# Patient Record
Sex: FEMALE | Race: WHITE | Employment: FULL TIME | URBAN - METROPOLITAN AREA
[De-identification: names, ages, dates, MRNs, and addresses within clinical notes are randomized per-mention and may not be internally consistent; named-entity substitution may affect disease eponyms.]

---

## 2018-07-16 ENCOUNTER — HOSPITAL ENCOUNTER (EMERGENCY)
Age: 43
Discharge: PSYCHIATRIC OR SUBSTANCE USE FACILITY | End: 2018-07-17
Attending: EMERGENCY MEDICINE
Payer: COMMERCIAL

## 2018-07-16 VITALS
OXYGEN SATURATION: 100 % | TEMPERATURE: 97.9 F | HEIGHT: 65 IN | SYSTOLIC BLOOD PRESSURE: 113 MMHG | BODY MASS INDEX: 26.33 KG/M2 | HEART RATE: 63 BPM | RESPIRATION RATE: 16 BRPM | DIASTOLIC BLOOD PRESSURE: 76 MMHG | WEIGHT: 158 LBS

## 2018-07-16 DIAGNOSIS — F32.A DEPRESSION, UNSPECIFIED DEPRESSION TYPE: ICD-10-CM

## 2018-07-16 DIAGNOSIS — R45.851 SUICIDAL IDEATION: Primary | ICD-10-CM

## 2018-07-16 DIAGNOSIS — N39.0 URINARY TRACT INFECTION WITHOUT HEMATURIA, SITE UNSPECIFIED: ICD-10-CM

## 2018-07-16 LAB
A/G RATIO: 1.4 (ref 1.1–2.2)
ALBUMIN SERPL-MCNC: 4.5 G/DL (ref 3.4–5)
ALP BLD-CCNC: 68 U/L (ref 40–129)
ALT SERPL-CCNC: 9 U/L (ref 10–40)
AMPHETAMINE SCREEN, URINE: NORMAL
ANION GAP SERPL CALCULATED.3IONS-SCNC: 12 MMOL/L (ref 3–16)
AST SERPL-CCNC: 15 U/L (ref 15–37)
BACTERIA: ABNORMAL /HPF
BARBITURATE SCREEN URINE: NORMAL
BASOPHILS ABSOLUTE: 0.1 K/UL (ref 0–0.2)
BASOPHILS RELATIVE PERCENT: 0.9 %
BENZODIAZEPINE SCREEN, URINE: NORMAL
BILIRUB SERPL-MCNC: 0.7 MG/DL (ref 0–1)
BILIRUBIN URINE: NEGATIVE
BLOOD, URINE: ABNORMAL
BUN BLDV-MCNC: 11 MG/DL (ref 7–20)
CALCIUM SERPL-MCNC: 9.9 MG/DL (ref 8.3–10.6)
CANNABINOID SCREEN URINE: NORMAL
CHLORIDE BLD-SCNC: 102 MMOL/L (ref 99–110)
CLARITY: CLEAR
CO2: 27 MMOL/L (ref 21–32)
COCAINE METABOLITE SCREEN URINE: NORMAL
COLOR: YELLOW
CREAT SERPL-MCNC: 0.9 MG/DL (ref 0.6–1.1)
EOSINOPHILS ABSOLUTE: 0.1 K/UL (ref 0–0.6)
EOSINOPHILS RELATIVE PERCENT: 1.4 %
EPITHELIAL CELLS, UA: ABNORMAL /HPF
ETHANOL: NORMAL MG/DL (ref 0–0.08)
GFR AFRICAN AMERICAN: >60
GFR NON-AFRICAN AMERICAN: >60
GLOBULIN: 3.3 G/DL
GLUCOSE BLD-MCNC: 92 MG/DL (ref 70–99)
GLUCOSE URINE: NEGATIVE MG/DL
HCG QUALITATIVE: NEGATIVE
HCT VFR BLD CALC: 44 % (ref 36–48)
HEMOGLOBIN: 15 G/DL (ref 12–16)
KETONES, URINE: NEGATIVE MG/DL
LEUKOCYTE ESTERASE, URINE: ABNORMAL
LYMPHOCYTES ABSOLUTE: 2.2 K/UL (ref 1–5.1)
LYMPHOCYTES RELATIVE PERCENT: 24.4 %
Lab: NORMAL
MAGNESIUM: 2.2 MG/DL (ref 1.8–2.4)
MCH RBC QN AUTO: 31.5 PG (ref 26–34)
MCHC RBC AUTO-ENTMCNC: 34 G/DL (ref 31–36)
MCV RBC AUTO: 92.6 FL (ref 80–100)
METHADONE SCREEN, URINE: NORMAL
MICROSCOPIC EXAMINATION: YES
MONOCYTES ABSOLUTE: 0.4 K/UL (ref 0–1.3)
MONOCYTES RELATIVE PERCENT: 4.6 %
MUCUS: ABNORMAL /LPF
NEUTROPHILS ABSOLUTE: 6.1 K/UL (ref 1.7–7.7)
NEUTROPHILS RELATIVE PERCENT: 68.7 %
NITRITE, URINE: POSITIVE
OPIATE SCREEN URINE: NORMAL
OXYCODONE URINE: NORMAL
PDW BLD-RTO: 13.7 % (ref 12.4–15.4)
PH UA: 7
PH UA: 7
PHENCYCLIDINE SCREEN URINE: NORMAL
PLATELET # BLD: 260 K/UL (ref 135–450)
PMV BLD AUTO: 8.7 FL (ref 5–10.5)
POTASSIUM SERPL-SCNC: 3.7 MMOL/L (ref 3.5–5.1)
PROPOXYPHENE SCREEN: NORMAL
PROTEIN UA: ABNORMAL MG/DL
RBC # BLD: 4.75 M/UL (ref 4–5.2)
RBC UA: ABNORMAL /HPF (ref 0–2)
SODIUM BLD-SCNC: 141 MMOL/L (ref 136–145)
SPECIFIC GRAVITY UA: <=1.005
TOTAL PROTEIN: 7.8 G/DL (ref 6.4–8.2)
URINE REFLEX TO CULTURE: YES
URINE TYPE: ABNORMAL
UROBILINOGEN, URINE: 0.2 E.U./DL
WBC # BLD: 8.9 K/UL (ref 4–11)
WBC UA: ABNORMAL /HPF (ref 0–5)

## 2018-07-16 PROCEDURE — 83735 ASSAY OF MAGNESIUM: CPT

## 2018-07-16 PROCEDURE — 87186 SC STD MICRODIL/AGAR DIL: CPT

## 2018-07-16 PROCEDURE — 85025 COMPLETE CBC W/AUTO DIFF WBC: CPT

## 2018-07-16 PROCEDURE — 84703 CHORIONIC GONADOTROPIN ASSAY: CPT

## 2018-07-16 PROCEDURE — 99285 EMERGENCY DEPT VISIT HI MDM: CPT

## 2018-07-16 PROCEDURE — 6370000000 HC RX 637 (ALT 250 FOR IP): Performed by: EMERGENCY MEDICINE

## 2018-07-16 PROCEDURE — 81001 URINALYSIS AUTO W/SCOPE: CPT

## 2018-07-16 PROCEDURE — 80053 COMPREHEN METABOLIC PANEL: CPT

## 2018-07-16 PROCEDURE — G0480 DRUG TEST DEF 1-7 CLASSES: HCPCS

## 2018-07-16 PROCEDURE — 87086 URINE CULTURE/COLONY COUNT: CPT

## 2018-07-16 PROCEDURE — 80307 DRUG TEST PRSMV CHEM ANLYZR: CPT

## 2018-07-16 PROCEDURE — 87077 CULTURE AEROBIC IDENTIFY: CPT

## 2018-07-16 RX ORDER — CEFUROXIME AXETIL 250 MG/1
500 TABLET ORAL ONCE
Status: COMPLETED | OUTPATIENT
Start: 2018-07-16 | End: 2018-07-16

## 2018-07-16 RX ADMIN — CEFUROXIME AXETIL 500 MG: 250 TABLET ORAL at 15:20

## 2018-07-16 ASSESSMENT — PATIENT HEALTH QUESTIONNAIRE - PHQ9: SUM OF ALL RESPONSES TO PHQ QUESTIONS 1-9: 21
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
## 2018-07-17 NOTE — ED NOTES
Report called to Jaz Rojas RN at [facility]. She is going to be treated for eating disorder, specifically bulmia , with purging and laxative abuse. Expected patient to be picked up at 0215 this morning.      Angel Luis Pineda RN  07/17/18 0200

## 2018-07-18 LAB
ORGANISM: ABNORMAL
URINE CULTURE, ROUTINE: ABNORMAL
URINE CULTURE, ROUTINE: ABNORMAL

## 2018-07-19 NOTE — PROGRESS NOTES
Culture reviewed, Culture is positive.  Patient is inpatient psych, please ensure they are treating with any antiobiotic that is sensitive

## 2019-12-09 ENCOUNTER — OFFICE VISIT (OUTPATIENT)
Dept: ORTHOPEDIC SURGERY | Age: 44
End: 2019-12-09
Payer: COMMERCIAL

## 2019-12-09 VITALS — HEIGHT: 65 IN | WEIGHT: 158 LBS | BODY MASS INDEX: 26.33 KG/M2

## 2019-12-09 DIAGNOSIS — M25.552 HIP PAIN, LEFT: Primary | ICD-10-CM

## 2019-12-09 PROCEDURE — 99243 OFF/OP CNSLTJ NEW/EST LOW 30: CPT | Performed by: ORTHOPAEDIC SURGERY

## 2019-12-09 RX ORDER — CELECOXIB 200 MG/1
200 CAPSULE ORAL 2 TIMES DAILY
Qty: 60 CAPSULE | Refills: 3 | Status: SHIPPED | OUTPATIENT
Start: 2019-12-09

## 2019-12-23 ENCOUNTER — OFFICE VISIT (OUTPATIENT)
Dept: ORTHOPEDIC SURGERY | Age: 44
End: 2019-12-23
Payer: COMMERCIAL

## 2019-12-23 VITALS — BODY MASS INDEX: 26.33 KG/M2 | HEIGHT: 65 IN | WEIGHT: 158 LBS

## 2019-12-23 DIAGNOSIS — M25.552 HIP PAIN, LEFT: Primary | ICD-10-CM

## 2019-12-23 DIAGNOSIS — M16.12 OSTEOARTHRITIS OF LEFT HIP, UNSPECIFIED OSTEOARTHRITIS TYPE: ICD-10-CM

## 2019-12-23 PROCEDURE — 99213 OFFICE O/P EST LOW 20 MIN: CPT | Performed by: ORTHOPAEDIC SURGERY

## 2020-01-09 ENCOUNTER — NURSE ONLY (OUTPATIENT)
Dept: ORTHOPEDIC SURGERY | Age: 45
End: 2020-01-09
Payer: COMMERCIAL

## 2020-01-09 VITALS — WEIGHT: 158.07 LBS | BODY MASS INDEX: 26.34 KG/M2 | HEIGHT: 65 IN

## 2020-01-09 RX ORDER — METHYLPREDNISOLONE ACETATE 40 MG/ML
80 INJECTION, SUSPENSION INTRA-ARTICULAR; INTRALESIONAL; INTRAMUSCULAR; SOFT TISSUE ONCE
Status: COMPLETED | OUTPATIENT
Start: 2020-01-09 | End: 2020-01-09

## 2020-01-09 RX ADMIN — METHYLPREDNISOLONE ACETATE 80 MG: 40 INJECTION, SUSPENSION INTRA-ARTICULAR; INTRALESIONAL; INTRAMUSCULAR; SOFT TISSUE at 16:44

## 2020-01-09 NOTE — PROGRESS NOTES
Subjective    Patient ID: Kira Kaur is a 38 y.o..  female. Chief Complaint   Patient presents with    Hip Pain     left        Patient presents today for the injection of left hip joint. Pt has been previously diagnosed with osteoarthritis of the left hip as documented in the prior progress notes. This injection is for the patients Left hip. Patient denies and fevers, chills, recent infections, or new injuries to the knee. Pain Assessment:       Patient's medications, allergies, past medical, surgical, social and family histories were reviewed and updated as appropriate. Physical Exam:    The patient does have  pain on motion, there is not an effusion, there is tenderness over the groin region. No erythema noted. Sensation intact to touch. Pulses present distally. Procedure Note:  The patient was given the option of performing today's injection using ultrasound guidance. We discussed the pros and cons of using the ultrasound for guidance. The patient chose to proceed, and today's injection was performed under sterile conditions using and [employer] ultrasound unit with a variable frequency (6.0-15.0  Mhz) linear transducer for localization and needle placement. The image was saved for the medical record. The risks and benefits of an injection were discussed with the patient. The patient had full opportunity to ask questions and all were answered. The patient then provided verbal informed consent. The skin was then prepped with betadine solution and alcohol. Under aseptic conditions, the  left hip joint was injected with 10cc of 1% xylocaine, then a mixture of 3cc of 0.25% marcaine and 2cc (80 mg) of Depomedrol. There were no immediate complications following the injection. The patient was advised of the possibility of injection site reaction and instructed to apply ice to the area.       None  Assessment:  1) cortisone injection of the Left hip   2) Osteoarthritis    Plan:  1) ice, elevation, gentle range of motion as needed for swelling or stiffness of the hip  2) NSAIDs for any pain after injection   3) F/U 3wks for re-evaluation    Salvatore Smith PA-C, scribing for and in the presence of Dr. Vitor Schultz   1/9/2020 6:13 PM    I, Dr. Patricia Burns, personally performed the services described in this documentation as scribed by Nieves Mckeon. DEAN Dillard in my presence, and it is both accurate and complete.     Patricia Burns MD

## 2020-01-09 NOTE — PROGRESS NOTES
SITE;  Left hip    SENSORCAINE    NDC#  31836-849-82  LOT NUMBER: [id number]        LIDOCAINE   NDC# 3722-5626-76  LOT NUMBER: [id number]

## 2020-02-19 ENCOUNTER — OFFICE VISIT (OUTPATIENT)
Dept: ORTHOPEDIC SURGERY | Age: 45
End: 2020-02-19
Payer: COMMERCIAL

## 2020-02-19 VITALS — BODY MASS INDEX: 26.33 KG/M2 | HEIGHT: 65 IN | WEIGHT: 158 LBS

## 2020-02-19 PROCEDURE — 99213 OFFICE O/P EST LOW 20 MIN: CPT | Performed by: ORTHOPAEDIC SURGERY

## 2020-05-27 ENCOUNTER — OFFICE VISIT (OUTPATIENT)
Dept: ORTHOPEDIC SURGERY | Age: 45
End: 2020-05-27
Payer: COMMERCIAL

## 2020-05-27 VITALS — HEIGHT: 65 IN | BODY MASS INDEX: 26.33 KG/M2 | WEIGHT: 158 LBS

## 2020-05-27 PROCEDURE — 20611 DRAIN/INJ JOINT/BURSA W/US: CPT | Performed by: ORTHOPAEDIC SURGERY

## 2020-05-27 PROCEDURE — 99213 OFFICE O/P EST LOW 20 MIN: CPT | Performed by: ORTHOPAEDIC SURGERY

## 2020-05-27 RX ORDER — METHYLPREDNISOLONE ACETATE 40 MG/ML
80 INJECTION, SUSPENSION INTRA-ARTICULAR; INTRALESIONAL; INTRAMUSCULAR; SOFT TISSUE ONCE
Status: COMPLETED | OUTPATIENT
Start: 2020-05-27 | End: 2020-05-27

## 2020-05-27 RX ORDER — LIDOCAINE HYDROCHLORIDE 10 MG/ML
70 INJECTION, SOLUTION INFILTRATION; PERINEURAL ONCE
Status: COMPLETED | OUTPATIENT
Start: 2020-05-27 | End: 2020-05-27

## 2020-05-27 RX ORDER — BUPIVACAINE HYDROCHLORIDE 2.5 MG/ML
60 INJECTION, SOLUTION INFILTRATION; PERINEURAL ONCE
Status: COMPLETED | OUTPATIENT
Start: 2020-05-27 | End: 2020-05-27

## 2020-05-27 RX ADMIN — BUPIVACAINE HYDROCHLORIDE 150 MG: 2.5 INJECTION, SOLUTION INFILTRATION; PERINEURAL at 17:26

## 2020-05-27 RX ADMIN — LIDOCAINE HYDROCHLORIDE 70 ML: 10 INJECTION, SOLUTION INFILTRATION; PERINEURAL at 17:26

## 2020-05-27 RX ADMIN — METHYLPREDNISOLONE ACETATE 80 MG: 40 INJECTION, SUSPENSION INTRA-ARTICULAR; INTRALESIONAL; INTRAMUSCULAR; SOFT TISSUE at 17:25

## 2020-05-27 NOTE — PROGRESS NOTES
MD Rita Enciso, [place]         Orthopaedic Surgery and Sports Medicine    Patient Name: Frida Nina  YOB: 1975  Patient's PCP is ALVERTO PIERRE    SUBJECTIVE  Chief Complaint:  Hip Pain (LEFT)      History of Present Illness:  Frida Nina is a 38 y.o. female here regarding left hip pain. The pain began 2019 and has been progressive. There was not a history of injury. The patient is currently ambulating independently      Location: diffuse  Quality: aching, sharp, tingling and constant  Pain Scale: 7/10  Context: overall course is worsening   Alleviating Factors: rest, avoiding painful activities, cortisone injection  Exacerbating Factors: activity, weight bearing  Associated Symptoms: tingling     Sleep pattern is affected by the chief complaint: Yes  The patient has had PT. The patient has had an injection, her last injection was January 2020 and she reports it lasted approximately 2 months. The patient has taken NSAIDs, currently on Celebrex which she does think helps take the edge off. The patient is working, as a teacher. Pain Assessment:  Pain Assessment  Location of Pain: Other (Comment)  Location Modifiers: Left  Severity of Pain: 7    Review of Systems:  Ace Hawley's review of systems has been performed by intake and observation. All past and current ROS forms have been scanned into the medical record. She has been instructed to contact her primary care provider regarding ROS issues if not already being addressed at this time. There are no recent changes.  The most recent ROS was scanned into media on 05/27/2020    Past Medical History:  (see most recent intake form scanned into media on above date)  Past Medical History:   Diagnosis Date    Hyperlipidemia     Polycystic ovarian disease        Past Surgical History:  (see most recent

## 2020-07-08 ENCOUNTER — OFFICE VISIT (OUTPATIENT)
Dept: ORTHOPEDIC SURGERY | Age: 45
End: 2020-07-08
Payer: COMMERCIAL

## 2020-07-08 VITALS — BODY MASS INDEX: 26.33 KG/M2 | HEIGHT: 65 IN | WEIGHT: 158 LBS

## 2020-07-08 PROCEDURE — 99213 OFFICE O/P EST LOW 20 MIN: CPT | Performed by: ORTHOPAEDIC SURGERY

## 2020-07-08 NOTE — PROGRESS NOTES
The most recent ROS was scanned into media on 05/27/2020    Past Medical History:  (see most recent intake form scanned into media on above date)  Past Medical History:   Diagnosis Date    Hyperlipidemia     Polycystic ovarian disease        Past Surgical History:  (see most recent intake form scanned into media on above date)  Past Surgical History:   Procedure Laterality Date    CYSTOSCOPY      KNEE SURGERY      reconstructive    TONSILLECTOMY         Allergies:  (see most recent intake form scanned into media on above date)  No Known Allergies    Medications:  (see most recent intake form scanned into media on above date)  Current Outpatient Medications   Medication Sig Dispense Refill    celecoxib (CELEBREX) 200 MG capsule Take 1 capsule by mouth 2 times daily 60 capsule 3    metFORMIN (GLUCOPHAGE) 500 MG tablet Take 500 mg by mouth daily (with breakfast).  MELOXICAM PO Take 15 tablets by mouth daily       vitamin D (ERGOCALCIFEROL) 36622 UNITS CAPS capsule Take 50,000 Units by mouth once a week. No current facility-administered medications for this visit. OBJECTIVE  PHYSICAL EXAM  Vital Signs: There were no vitals filed for this visit. Body mass index is 26.29 kg/m². General Appearance: Patient is adequately groomed with no evidence of malnutrition   Orientation: Patient is alert and oriented to person, place and time  Mood and Affect: Neutral/Euthymic(normal)  Gait and Station: antalgic. The patient can bear weight on the extremity. Left Hip Examination  Inspection:  No gross deformities noted           no swelling noted. No erythema or ecchymosis. Palpation: mild tenderness to palpation on the anterior. Range of Motion: limited range of motion with all directions    Stability and Special Testing: Log roll test: negative             Straight leg test: negative    Strength: No gross motor weakness noted. All muscle groups appear to be functioning.  Motor exam of the lower extremities show quadriceps, hamstrings, foot dorsiflexion and plantarflexion grossly intact. Neurologic: Sensation to both feet is grossly intact to light touch. Vascular: The bilateral lower extremities are warm and well-perfused with brisk capillary refill. Lymphatic: The lymphatic examination bilaterally reveals all areas to be without enlargement or induration    Skin: intact with no cellulitis, rashes, ulcerations, lymphedema or cutaneous lesions noted. Additional Examinations:  Right Lower Extremity: Examination of the right lower extremity does not show any tenderness, deformity or injury. Range of motion is within normal limits. There is no gross instability. There are no rashes, ulcerations or lesions. Strength and tone are normal.      DIAGNOSTICS  Xrays obtained in office today: No  Xrays reviewed today: Yes  2 views of the left hip   Fracture: No  Dislocation: No  Femoracetabular joint arthritis: moderate  joint space narrowing especially superior lateral she does have cam lesions bilaterally evidence of femoral acetabular impingement. MRI of the left hip from December 11, 2019 shows osteochondral erosions of the left acetabulum with labral tearing, joint space loss and subchondral pseudocyst formation likely a manifestation of chronic cam type impingement. Moderate left hip effusion. Capsular cyst versus para labral cyst anteriorly projecting from the left femoral acetabular joint. Peritendinitis and tendinosis of the iliopsoas and gluteal tendons. ASSESSMENT (Medical Decision Making)    Glo Esquivel is a 41 y.o. female with the following diagnosis: Left hip femoral acetabular into impingement with some joint space narrowing      ICD-10-CM    1. Osteoarthritis of left hip, unspecified osteoarthritis type M16.12    2.  Hip pain, left M25.552            PLAN (Medical Decision Making)  Office Procedures:  No orders of the defined types were placed in this encounter. Treatment Plan:    I discussed the diagnosis and treatment options with Donzell Blizzard today. Today, the patient has not seen much improvement with her latest injection. She would like to proceed with a hip joint replacement and we would suggest she see Dr. Tammy Gruber. Non-steroidal anti-inflammatories medications (NSAIDs) can be used to assist with pain control and to reduce inflammatory changes. These medications may be over-the-counter or prescribed. We discussed taking the NSAID properly and the precautions. The patient understands that this medication may potentially interfere with other medications. Patient was also instructed to immediately discontinue the medication is there is any possible complication. Donzell Blizzard was instructed to call the office if her symptoms worsen or if new symptoms appear prior to the next scheduled visit. She is specifically instructed to contact the office between now and schedule appointment if she has concerns related to her condition or if she needs assistance in scheduling any above tests. She is welcome to call for an appointment sooner if she has any additional concerns or questions. Will Kendrick PA-C, scribing for and in the presence of Dr. Tk Majano   7/8/2020 11:55 AM    I, Dr. Nhan Jain, personally performed the services described in this documentation as scribed by Enolia Mortimer. DEAN Dillard in my presence, and it is both accurate and complete. Nhan Jain MD      This dictation was performed with a verbal recognition program [facility]) and it was checked for errors. It is possible that there are still dictated errors within this office note. If so, please bring any errors to my attention for an addendum. All efforts were made to ensure that this office note is accurate.

## 2020-07-13 ENCOUNTER — OFFICE VISIT (OUTPATIENT)
Dept: ORTHOPEDIC SURGERY | Age: 45
End: 2020-07-13
Payer: COMMERCIAL

## 2020-07-13 VITALS — BODY MASS INDEX: 26.33 KG/M2 | HEIGHT: 65 IN | WEIGHT: 158 LBS

## 2020-07-13 PROCEDURE — 99214 OFFICE O/P EST MOD 30 MIN: CPT | Performed by: ORTHOPAEDIC SURGERY

## 2020-07-13 NOTE — PROGRESS NOTES
CHIEF COMPLAINT:    Chief Complaint   Patient presents with    Hip Pain     LEFT HIP, REF BY DTS FOR THR       HISTORY OF PRESENT ILLNESS:    Is a very pleasant lady presents today for evaluation treatment through Dr. Suha Pederson. She is been struggle with ongoing disabling Lefton hip pain for many many months if not years. She is received corticosteroid injections intra-articularly by Dr. Suha Pederson in the office. She reports minimal relief from these injections. She is totally disabled by the pain wants to pursue joint replacement surgery which she has discussed with Dr. Suha Pederson. She denies any low back pain numbness or tingling although she does have soreness throughout her body. She is been worked up from a rheumatologic standpoint but she is never found anything positive. Again however she does voice diffuse joint aches and pains. The patient is a 41 y.o. female   Past Medical History:   Diagnosis Date    Hyperlipidemia     Polycystic ovarian disease         Work Status: She is 5  in [employer]. The pain assessment was noted & is as follows:  Pain Assessment  Location of Pain: Pelvis  Location Modifiers: Left  Severity of Pain: 6  Quality of Pain: Aching  Duration of Pain: Persistent  Frequency of Pain: Constant]      Work Status/Functionality:     Past Medical History: Medical history form was reviewed today & can be found in the media tab  Past Medical History:   Diagnosis Date    Hyperlipidemia     Polycystic ovarian disease       Past Surgical History:     Past Surgical History:   Procedure Laterality Date    CYSTOSCOPY      KNEE SURGERY      reconstructive    TONSILLECTOMY       Current Medications:     Current Outpatient Medications:     celecoxib (CELEBREX) 200 MG capsule, Take 1 capsule by mouth 2 times daily, Disp: 60 capsule, Rfl: 3    metFORMIN (GLUCOPHAGE) 500 MG tablet, Take 500 mg by mouth daily (with breakfast). , Disp: , Rfl:     MELOXICAM PO, Take 15 tablets by mouth daily , Disp: , Rfl:     vitamin D (ERGOCALCIFEROL) 28872 UNITS CAPS capsule, Take 50,000 Units by mouth once a week., Disp: , Rfl:   Allergies:  Patient has no known allergies. Social History:    reports that she has never smoked. She has never used smokeless tobacco. She reports current alcohol use. She reports that she does not use drugs. Family History:   No family history on file. REVIEW OF SYSTEMS:   For new problems, a full review of systems will be found scanned in the patient's chart. CONSTITUTIONAL: Denies unexplained weight loss, fevers, chills   NEUROLOGICAL: Denies unsteady gait or progressive weakness  SKIN: Denies skin changes, delayed healing, rash, itching       PHYSICAL EXAM:    Vitals: Height 5' 5\" (1.651 m), weight 158 lb (71.7 kg). GENERAL EXAM:  · General Apparence: Patient is adequately groomed with no evidence of malnutrition. · Orientation: The patient is oriented to time, place and person. · Mood & Affect:The patient's mood and affect are appropriate       LEFT hip PHYSICAL EXAMINATION:  · Inspection: No visible asymmetry deformity or atrophy. · Palpation: Tender in the groin left-sided. Mild over the greater trochanter as well. Mild over the SI joint. · Range of Motion: Very limited. She has virtually no internal rotation of the LEFT hip and 50 degrees of external rotation. If flexion is painful at 85. · Strength: 5/5    · Special Tests: Positive logroll examination left ear negative straight leg raise examination LEFT. · Skin:  There are no rashes, ulcerations or lesions. · There are no distal dysvascular changes     Gait & station:       Additional Examinations: The right hip demonstrates good range of motion. No pain with logroll examination. Negative straight leg raise on the right. Diagnostic Testing:   The following x rays were read and interpreted by myself      Reviewed 2 x-rays from 12/9/2019 of the left ear which demonstrates severe osteoarthritic change of the hip with cam anatomy and a distinct cyst involving the head/acetabulum. Orders   No orders of the defined types were placed in this encounter. Assessment / Treatment Plan:     1. End-stage osteoarthritis of the Lefton. She is already exhausted to intra-articular hip injections. She is interested in pursuing total joint replacement surgery. I spent a great deal time with her showing her the models in the literature. She understands all of this and wants to pursue surgery. She is a schoolteacher as noted above so were trying to orchestrate when that is going to be best for her to pursue the surgery. 2.  She had an intra-articular hip injection on 5/27 so the absolute earliest she could go would be August 27.    3. Demand  matching tool completed. She matches to a [facility] hip. We discussed the risk, benefits and complications of total hip replacement arthroplasty surgery. We specifically discussed concerns including infection, deep vein thrombosis, neurological injury, and specifically sciatic nerve injury with the possibility of footdrop or other neurological compromise. We further discussed the possibility of dislocation of the prosthesis and the precautions that are involved after total hip replacement surgery to try to avoid dislocation. We also discussed the reality of the rehabilitation process. We discussed the rehabilitation involved with total hip replacement surgery, possible inpatient rehabilitation facility placement versus going home with home physical therapy. We also talked about visiting with [facility] postoperative physical therapy to regain range of motion and strength after the surgery. All questions were answered. The appropriate literature was given to the patient.     I have personally performed and/or participated in the history, exam and medical decision making and agree with all pertinent clinical information. I have also reviewed and agree with the past medical, family and social history unless otherwise noted. This dictation was performed with a verbal recognition program ([employer]) and it was checked for errors. It is possible that there are still dictated errors within this office note. If so, please bring any errors to my attention for an addendum. All efforts were made to ensure that this office note is accurate.           Alicia King MD

## 2020-08-31 ENCOUNTER — TELEPHONE (OUTPATIENT)
Dept: ORTHOPEDIC SURGERY | Age: 45
End: 2020-08-31

## 2020-08-31 NOTE — TELEPHONE ENCOUNTER
AuthPenelope Essex [id number]    Date: 09/24/20  Type of SX:  INPATIENT CHANGED TO OUTPATIENT   Location: [facility]  CPT: 65799   DX Code: M16.12  SX area: L HIP  Insurance: [facility]   SURGERY APPROVED BY NURSE MICHAUD  VALID DATES 09/24/20 TO 11/22/20

## 2020-09-10 ENCOUNTER — HOSPITAL ENCOUNTER (OUTPATIENT)
Dept: PREADMISSION TESTING | Age: 45
Discharge: HOME | End: 2020-09-14
Payer: COMMERCIAL

## 2020-09-10 LAB
ABO/RH: NORMAL
ALBUMIN SERPL-MCNC: 4.3 G/DL (ref 3.4–5)
ANION GAP SERPL CALCULATED.3IONS-SCNC: 14 MMOL/L (ref 3–16)
ANTIBODY SCREEN: NORMAL
APTT: 32.8 SEC (ref 24.2–36.2)
BACTERIA: ABNORMAL /HPF
BASOPHILS ABSOLUTE: 0.1 K/UL (ref 0–0.2)
BASOPHILS RELATIVE PERCENT: 0.9 %
BILIRUBIN URINE: NEGATIVE
BLOOD, URINE: ABNORMAL
BUN BLDV-MCNC: 11 MG/DL (ref 7–20)
CALCIUM SERPL-MCNC: 9.6 MG/DL (ref 8.3–10.6)
CHLORIDE BLD-SCNC: 101 MMOL/L (ref 99–110)
CLARITY: CLEAR
CO2: 22 MMOL/L (ref 21–32)
COLOR: YELLOW
CREAT SERPL-MCNC: 0.9 MG/DL (ref 0.6–1.1)
EKG ATRIAL RATE: 77 BPM
EKG DIAGNOSIS: NORMAL
EKG P AXIS: 54 DEGREES
EKG P-R INTERVAL: 136 MS
EKG Q-T INTERVAL: 370 MS
EKG QRS DURATION: 74 MS
EKG QTC CALCULATION (BAZETT): 418 MS
EKG R AXIS: 24 DEGREES
EKG T AXIS: 32 DEGREES
EKG VENTRICULAR RATE: 77 BPM
EOSINOPHILS ABSOLUTE: 0.4 K/UL (ref 0–0.6)
EOSINOPHILS RELATIVE PERCENT: 3.7 %
EPITHELIAL CELLS, UA: ABNORMAL /HPF (ref 0–5)
GFR AFRICAN AMERICAN: >60
GFR NON-AFRICAN AMERICAN: >60
GLUCOSE BLD-MCNC: 97 MG/DL (ref 70–99)
GLUCOSE URINE: NEGATIVE MG/DL
HCT VFR BLD CALC: 41.5 % (ref 36–48)
HEMOGLOBIN: 13.7 G/DL (ref 12–16)
INR BLD: 0.98 (ref 0.86–1.14)
KETONES, URINE: NEGATIVE MG/DL
LEUKOCYTE ESTERASE, URINE: NEGATIVE
LYMPHOCYTES ABSOLUTE: 2.8 K/UL (ref 1–5.1)
LYMPHOCYTES RELATIVE PERCENT: 26.6 %
MCH RBC QN AUTO: 31 PG (ref 26–34)
MCHC RBC AUTO-ENTMCNC: 33.1 G/DL (ref 31–36)
MCV RBC AUTO: 93.5 FL (ref 80–100)
MICROSCOPIC EXAMINATION: YES
MONOCYTES ABSOLUTE: 0.5 K/UL (ref 0–1.3)
MONOCYTES RELATIVE PERCENT: 5.1 %
NEUTROPHILS ABSOLUTE: 6.7 K/UL (ref 1.7–7.7)
NEUTROPHILS RELATIVE PERCENT: 63.7 %
NITRITE, URINE: NEGATIVE
PDW BLD-RTO: 13.1 % (ref 12.4–15.4)
PH UA: 6.5 (ref 5–8)
PLATELET # BLD: 295 K/UL (ref 135–450)
PMV BLD AUTO: 8.1 FL (ref 5–10.5)
POTASSIUM SERPL-SCNC: 3.9 MMOL/L (ref 3.5–5.1)
PROTEIN UA: NEGATIVE MG/DL
PROTHROMBIN TIME: 11.4 SEC (ref 10–13.2)
RBC # BLD: 4.44 M/UL (ref 4–5.2)
RBC UA: ABNORMAL /HPF (ref 0–4)
SODIUM BLD-SCNC: 137 MMOL/L (ref 136–145)
SPECIFIC GRAVITY UA: <=1.005 (ref 1–1.03)
TRANSFERRIN: 269 MG/DL (ref 200–360)
URINE TYPE: ABNORMAL
UROBILINOGEN, URINE: 0.2 E.U./DL
WBC # BLD: 10.6 K/UL (ref 4–11)
WBC UA: ABNORMAL /HPF (ref 0–5)

## 2020-09-10 PROCEDURE — 87086 URINE CULTURE/COLONY COUNT: CPT

## 2020-09-10 PROCEDURE — 86900 BLOOD TYPING SEROLOGIC ABO: CPT

## 2020-09-10 PROCEDURE — 84466 ASSAY OF TRANSFERRIN: CPT

## 2020-09-10 PROCEDURE — 82040 ASSAY OF SERUM ALBUMIN: CPT

## 2020-09-10 PROCEDURE — 87186 SC STD MICRODIL/AGAR DIL: CPT

## 2020-09-10 PROCEDURE — 87088 URINE BACTERIA CULTURE: CPT

## 2020-09-10 PROCEDURE — 85730 THROMBOPLASTIN TIME PARTIAL: CPT

## 2020-09-10 PROCEDURE — 85610 PROTHROMBIN TIME: CPT

## 2020-09-10 PROCEDURE — 86850 RBC ANTIBODY SCREEN: CPT

## 2020-09-10 PROCEDURE — 81001 URINALYSIS AUTO W/SCOPE: CPT

## 2020-09-10 PROCEDURE — 83036 HEMOGLOBIN GLYCOSYLATED A1C: CPT

## 2020-09-10 PROCEDURE — 86901 BLOOD TYPING SEROLOGIC RH(D): CPT

## 2020-09-10 PROCEDURE — 36415 COLL VENOUS BLD VENIPUNCTURE: CPT

## 2020-09-10 PROCEDURE — 93005 ELECTROCARDIOGRAM TRACING: CPT

## 2020-09-10 PROCEDURE — 80048 BASIC METABOLIC PNL TOTAL CA: CPT

## 2020-09-10 PROCEDURE — 85025 COMPLETE CBC W/AUTO DIFF WBC: CPT

## 2020-09-11 LAB
ESTIMATED AVERAGE GLUCOSE: 119.8 MG/DL
HBA1C MFR BLD: 5.8 %

## 2020-09-13 LAB
ORGANISM: ABNORMAL
URINE CULTURE, ROUTINE: ABNORMAL
URINE CULTURE, ROUTINE: ABNORMAL

## 2020-09-16 ENCOUNTER — TELEPHONE (OUTPATIENT)
Dept: ORTHOPEDIC SURGERY | Age: 45
End: 2020-09-16

## 2020-09-17 ENCOUNTER — ANESTHESIA EVENT (OUTPATIENT)
Dept: OPERATING ROOM | Age: 45
End: 2020-09-17
Payer: COMMERCIAL

## 2020-09-21 RX ORDER — TIZANIDINE 4 MG/1
4 TABLET ORAL DAILY
COMMUNITY
Start: 2020-08-13

## 2020-09-21 RX ORDER — LEVOTHYROXINE SODIUM 0.05 MG/1
50 TABLET ORAL DAILY
COMMUNITY

## 2020-09-21 RX ORDER — TRAMADOL HYDROCHLORIDE 50 MG/1
50 TABLET ORAL DAILY
Status: ON HOLD | COMMUNITY
Start: 2020-08-14 | End: 2020-09-25 | Stop reason: HOSPADM

## 2020-09-21 NOTE — PROGRESS NOTES
Obstructive Sleep Apnea (MAGALYS) Screening     Patient:  Clif King    YOB: 1975      Medical Record #:  [id number]                     Date:  9/21/2020     1. Are you a loud and/or regular snorer? [x]  Yes       [] No    2. Have you been observed to gasp or stop breathing during sleep? []  Yes       [x] No    3. Do you feel tired or groggy upon awakening or do you awaken with a headache?           []  Yes       [x] No    4. Are you often tired or fatigued during the wake time hours? []  Yes       [x] No    5. Do you fall asleep sitting, reading, watching TV or driving? []  Yes       [x] No    6. Do you often have problems with memory or concentration? []  Yes       [x] No    If patient's MAGALYS score if greater than or equal to 3, they are considered high risk for MAGALYS. An anesthesia provider will evaluate the patient and develop a plan of care the day of surgery. Note:  If the patient's BMI is more than 35 kg m¯² , has neck circumference > 40 cm, and/or high blood pressure the risk is greater (© American Sleep [employer]nea Association, 2006).

## 2020-09-22 ENCOUNTER — OFFICE VISIT (OUTPATIENT)
Dept: PRIMARY CARE CLINIC | Age: 45
End: 2020-09-22
Payer: COMMERCIAL

## 2020-09-22 LAB — SARS-COV-2, PCR: NOT DETECTED

## 2020-09-22 PROCEDURE — 99211 OFF/OP EST MAY X REQ PHY/QHP: CPT | Performed by: NURSE PRACTITIONER

## 2020-09-22 NOTE — PROGRESS NOTES
Ace TEDDY Hawley received a viral test for COVID-19. They were educated on isolation and quarantine as appropriate. For any symptoms, they were directed to seek care from their PCP, given contact information to establish with a doctor, directed to an urgent care or the emergency room.

## 2020-09-22 NOTE — PATIENT INSTRUCTIONS
Advance Care Planning  People with COVID-19 may have no symptoms, mild symptoms, such as fever, cough, and shortness of breath or they may have more severe illness, developing severe and fatal pneumonia. As a result, Advance Care Planning with attention to naming a health care decision maker (someone you trust to make healthcare decisions for you if you could not speak for yourself) and sharing other health care preferences is important BEFORE a possible health crisis. Please contact your Primary Care Provider to discuss Advance Care Planning. Preventing the Spread of Coronavirus Disease 2019 in Homes and Residential Communities  For the most recent information go to [employer]    Prevention steps for People with confirmed or suspected COVID-19 (including persons under investigation) who do not need to be hospitalized  and   People with confirmed COVID-19 who were hospitalized and determined to be medically stable to go home    Your healthcare provider and public health staff will evaluate whether you can be cared for at home. If it is determined that you do not need to be hospitalized and can be isolated at home, you will be monitored by staff from your local or state health department. You should follow the prevention steps below until a healthcare provider or local or state health department says you can return to your normal activities. Stay home except to get medical care  People who are mildly ill with COVID-19 are able to isolate at home during their illness. You should restrict activities outside your home, except for getting medical care. Do not go to work, school, or public areas. Avoid using public transportation, ride-sharing, or taxis. Separate yourself from other people and animals in your home  People: As much as possible, you should stay in a specific room and away from other people in your home.  Also, you should use a separate bathroom, if available. Animals: You should restrict contact with pets and other animals while you are sick with COVID-19, just like you would around other people. Although there have not been reports of pets or other animals becoming sick with COVID-19, it is still recommended that people sick with COVID-19 limit contact with animals until more information is known about the virus. When possible, have another member of your household care for your animals while you are sick. If you are sick with COVID-19, avoid contact with your pet, including petting, snuggling, being kissed or licked, and sharing food. If you must care for your pet or be around animals while you are sick, wash your hands before and after you interact with pets and wear a facemask. Call ahead before visiting your doctor  If you have a medical appointment, call the healthcare provider and tell them that you have or may have COVID-19. This will help the healthcare providers office take steps to keep other people from getting infected or exposed. Wear a facemask  You should wear a facemask when you are around other people (e.g., sharing a room or vehicle) or pets and before you enter a healthcare providers office. If you are not able to wear a facemask (for example, because it causes trouble breathing), then people who live with you should not stay in the same room with you, or they should wear a facemask if they enter your room. Cover your coughs and sneezes  Cover your mouth and nose with a tissue when you cough or sneeze. Throw used tissues in a lined trash can. Immediately wash your hands with soap and water for at least 20 seconds or, if soap and water are not available, clean your hands with an alcohol-based hand  that contains at least 60% alcohol.   Clean your hands often  Wash your hands often with soap and water for at least 20 seconds, especially after blowing your nose, coughing, or sneezing; going to the bathroom; and

## 2020-09-23 ENCOUNTER — TELEPHONE (OUTPATIENT)
Dept: ORTHOPEDIC SURGERY | Age: 45
End: 2020-09-23

## 2020-09-24 ENCOUNTER — APPOINTMENT (OUTPATIENT)
Dept: GENERAL RADIOLOGY | Age: 45
End: 2020-09-24
Attending: ORTHOPAEDIC SURGERY
Payer: COMMERCIAL

## 2020-09-24 ENCOUNTER — HOSPITAL ENCOUNTER (OUTPATIENT)
Age: 45
Setting detail: OBSERVATION
Discharge: HOME | End: 2020-09-25
Attending: ORTHOPAEDIC SURGERY | Admitting: ORTHOPAEDIC SURGERY
Payer: COMMERCIAL

## 2020-09-24 ENCOUNTER — ANESTHESIA (OUTPATIENT)
Dept: OPERATING ROOM | Age: 45
End: 2020-09-24
Payer: COMMERCIAL

## 2020-09-24 VITALS
RESPIRATION RATE: 15 BRPM | OXYGEN SATURATION: 100 % | DIASTOLIC BLOOD PRESSURE: 48 MMHG | SYSTOLIC BLOOD PRESSURE: 86 MMHG

## 2020-09-24 PROBLEM — Z96.642 HISTORY OF TOTAL LEFT HIP REPLACEMENT: Status: ACTIVE | Noted: 2020-09-24

## 2020-09-24 PROBLEM — E03.9 HYPOTHYROID: Status: ACTIVE | Noted: 2020-09-24

## 2020-09-24 PROBLEM — Z96.642 STATUS POST TOTAL HIP REPLACEMENT, LEFT: Status: ACTIVE | Noted: 2020-09-24

## 2020-09-24 PROBLEM — E11.9 DM (DIABETES MELLITUS), TYPE 2 (HCC): Status: ACTIVE | Noted: 2020-09-24

## 2020-09-24 LAB
ABO/RH: NORMAL
ANTIBODY SCREEN: NORMAL
GLUCOSE BLD-MCNC: 178 MG/DL (ref 70–99)
GLUCOSE BLD-MCNC: 81 MG/DL (ref 70–99)
GLUCOSE BLD-MCNC: 91 MG/DL (ref 70–99)
PERFORMED ON: ABNORMAL
PERFORMED ON: NORMAL
PERFORMED ON: NORMAL
PREGNANCY, URINE: NEGATIVE

## 2020-09-24 PROCEDURE — 2709999900 HC NON-CHARGEABLE SUPPLY: Performed by: ORTHOPAEDIC SURGERY

## 2020-09-24 PROCEDURE — 2500000003 HC RX 250 WO HCPCS

## 2020-09-24 PROCEDURE — 6370000000 HC RX 637 (ALT 250 FOR IP): Performed by: PHYSICIAN ASSISTANT

## 2020-09-24 PROCEDURE — 73501 X-RAY EXAM HIP UNI 1 VIEW: CPT

## 2020-09-24 PROCEDURE — 6360000002 HC RX W HCPCS: Performed by: PHYSICIAN ASSISTANT

## 2020-09-24 PROCEDURE — 2500000003 HC RX 250 WO HCPCS: Performed by: ORTHOPAEDIC SURGERY

## 2020-09-24 PROCEDURE — 3600000005 HC SURGERY LEVEL 5 BASE: Performed by: ORTHOPAEDIC SURGERY

## 2020-09-24 PROCEDURE — 2500000003 HC RX 250 WO HCPCS: Performed by: ANESTHESIOLOGY

## 2020-09-24 PROCEDURE — G0378 HOSPITAL OBSERVATION PER HR: HCPCS

## 2020-09-24 PROCEDURE — 86850 RBC ANTIBODY SCREEN: CPT

## 2020-09-24 PROCEDURE — C1713 ANCHOR/SCREW BN/BN,TIS/BN: HCPCS | Performed by: ORTHOPAEDIC SURGERY

## 2020-09-24 PROCEDURE — 2580000003 HC RX 258: Performed by: ORTHOPAEDIC SURGERY

## 2020-09-24 PROCEDURE — 2580000003 HC RX 258: Performed by: ANESTHESIOLOGY

## 2020-09-24 PROCEDURE — 2580000003 HC RX 258: Performed by: PHYSICIAN ASSISTANT

## 2020-09-24 PROCEDURE — 64447 NJX AA&/STRD FEMORAL NRV IMG: CPT | Performed by: ANESTHESIOLOGY

## 2020-09-24 PROCEDURE — 86901 BLOOD TYPING SEROLOGIC RH(D): CPT

## 2020-09-24 PROCEDURE — 6360000002 HC RX W HCPCS

## 2020-09-24 PROCEDURE — 3700000000 HC ANESTHESIA ATTENDED CARE: Performed by: ORTHOPAEDIC SURGERY

## 2020-09-24 PROCEDURE — 7100000001 HC PACU RECOVERY - ADDTL 15 MIN: Performed by: ORTHOPAEDIC SURGERY

## 2020-09-24 PROCEDURE — 6360000002 HC RX W HCPCS: Performed by: ORTHOPAEDIC SURGERY

## 2020-09-24 PROCEDURE — 3600000015 HC SURGERY LEVEL 5 ADDTL 15MIN: Performed by: ORTHOPAEDIC SURGERY

## 2020-09-24 PROCEDURE — 3700000001 HC ADD 15 MINUTES (ANESTHESIA): Performed by: ORTHOPAEDIC SURGERY

## 2020-09-24 PROCEDURE — 7100000000 HC PACU RECOVERY - FIRST 15 MIN: Performed by: ORTHOPAEDIC SURGERY

## 2020-09-24 PROCEDURE — 84703 CHORIONIC GONADOTROPIN ASSAY: CPT

## 2020-09-24 PROCEDURE — 6370000000 HC RX 637 (ALT 250 FOR IP): Performed by: ANESTHESIOLOGY

## 2020-09-24 PROCEDURE — 86900 BLOOD TYPING SEROLOGIC ABO: CPT

## 2020-09-24 PROCEDURE — C1776 JOINT DEVICE (IMPLANTABLE): HCPCS | Performed by: ORTHOPAEDIC SURGERY

## 2020-09-24 PROCEDURE — C9290 INJ, BUPIVACAINE LIPOSOME: HCPCS | Performed by: ORTHOPAEDIC SURGERY

## 2020-09-24 DEVICE — R3 20 DEGREE XLPE ACETABULAR LINER                                    36MM INNER DIAMETER X OUTER DIAMETER 54MM
Type: IMPLANTABLE DEVICE | Site: HIP | Status: FUNCTIONAL
Brand: R3

## 2020-09-24 DEVICE — R3 SCREW HOLE COVER
Type: IMPLANTABLE DEVICE | Site: HIP | Status: FUNCTIONAL
Brand: R3

## 2020-09-24 DEVICE — OXINIUM FEMORAL HEAD 12/14 TAPER                                    36 MM +0
Type: IMPLANTABLE DEVICE | Site: HIP | Status: FUNCTIONAL
Brand: OXINIUM

## 2020-09-24 DEVICE — REFLECTION SPHERICAL HEAD SCREW 25MM
Type: IMPLANTABLE DEVICE | Site: HIP | Status: FUNCTIONAL
Brand: REFLECTION

## 2020-09-24 DEVICE — R3 3 HOLE ACETABULAR SHELL 54MM
Type: IMPLANTABLE DEVICE | Site: HIP | Status: FUNCTIONAL
Brand: R3 ACETABULAR

## 2020-09-24 DEVICE — REFLECTION THREADED HOLE COVER
Type: IMPLANTABLE DEVICE | Site: HIP | Status: FUNCTIONAL
Brand: REFLECTION

## 2020-09-24 DEVICE — SYNERGY POROUS FEMORAL COMPONENT SZ 13
Type: IMPLANTABLE DEVICE | Site: HIP | Status: FUNCTIONAL
Brand: SYNERGY

## 2020-09-24 RX ORDER — ACETAMINOPHEN 325 MG/1
650 TABLET ORAL EVERY 6 HOURS
Status: DISCONTINUED | OUTPATIENT
Start: 2020-09-24 | End: 2020-09-25 | Stop reason: HOSPADM

## 2020-09-24 RX ORDER — PROMETHAZINE HYDROCHLORIDE 25 MG/ML
6.25 INJECTION, SOLUTION INTRAMUSCULAR; INTRAVENOUS
Status: DISCONTINUED | OUTPATIENT
Start: 2020-09-24 | End: 2020-09-24 | Stop reason: HOSPADM

## 2020-09-24 RX ORDER — DEXTROSE MONOHYDRATE 25 G/50ML
12.5 INJECTION, SOLUTION INTRAVENOUS PRN
Status: DISCONTINUED | OUTPATIENT
Start: 2020-09-24 | End: 2020-09-25 | Stop reason: HOSPADM

## 2020-09-24 RX ORDER — CELECOXIB 100 MG/1
100 CAPSULE ORAL 2 TIMES DAILY
Status: DISCONTINUED | OUTPATIENT
Start: 2020-09-24 | End: 2020-09-25 | Stop reason: HOSPADM

## 2020-09-24 RX ORDER — SODIUM CHLORIDE, SODIUM LACTATE, POTASSIUM CHLORIDE, CALCIUM CHLORIDE 600; 310; 30; 20 MG/100ML; MG/100ML; MG/100ML; MG/100ML
INJECTION, SOLUTION INTRAVENOUS CONTINUOUS
Status: DISCONTINUED | OUTPATIENT
Start: 2020-09-24 | End: 2020-09-24

## 2020-09-24 RX ORDER — SENNA AND DOCUSATE SODIUM 50; 8.6 MG/1; MG/1
1 TABLET, FILM COATED ORAL 2 TIMES DAILY
Status: DISCONTINUED | OUTPATIENT
Start: 2020-09-24 | End: 2020-09-25 | Stop reason: HOSPADM

## 2020-09-24 RX ORDER — SODIUM CHLORIDE 0.9 % (FLUSH) 0.9 %
10 SYRINGE (ML) INJECTION PRN
Status: DISCONTINUED | OUTPATIENT
Start: 2020-09-24 | End: 2020-09-24 | Stop reason: HOSPADM

## 2020-09-24 RX ORDER — FENTANYL CITRATE 50 UG/ML
50 INJECTION, SOLUTION INTRAMUSCULAR; INTRAVENOUS EVERY 5 MIN PRN
Status: DISCONTINUED | OUTPATIENT
Start: 2020-09-24 | End: 2020-09-24 | Stop reason: HOSPADM

## 2020-09-24 RX ORDER — TRANEXAMIC ACID 100 MG/ML
INJECTION, SOLUTION INTRAVENOUS PRN
Status: DISCONTINUED | OUTPATIENT
Start: 2020-09-24 | End: 2020-09-24 | Stop reason: SDUPTHER

## 2020-09-24 RX ORDER — MEPERIDINE HYDROCHLORIDE 50 MG/ML
12.5 INJECTION INTRAMUSCULAR; INTRAVENOUS; SUBCUTANEOUS EVERY 5 MIN PRN
Status: DISCONTINUED | OUTPATIENT
Start: 2020-09-24 | End: 2020-09-24 | Stop reason: HOSPADM

## 2020-09-24 RX ORDER — ROCURONIUM BROMIDE 10 MG/ML
INJECTION, SOLUTION INTRAVENOUS PRN
Status: DISCONTINUED | OUTPATIENT
Start: 2020-09-24 | End: 2020-09-24 | Stop reason: SDUPTHER

## 2020-09-24 RX ORDER — DEXTROSE MONOHYDRATE 50 MG/ML
100 INJECTION, SOLUTION INTRAVENOUS PRN
Status: DISCONTINUED | OUTPATIENT
Start: 2020-09-24 | End: 2020-09-25 | Stop reason: HOSPADM

## 2020-09-24 RX ORDER — SODIUM CHLORIDE 0.9 % (FLUSH) 0.9 %
10 SYRINGE (ML) INJECTION PRN
Status: DISCONTINUED | OUTPATIENT
Start: 2020-09-24 | End: 2020-09-25 | Stop reason: HOSPADM

## 2020-09-24 RX ORDER — SODIUM CHLORIDE 9 MG/ML
INJECTION, SOLUTION INTRAVENOUS CONTINUOUS
Status: DISCONTINUED | OUTPATIENT
Start: 2020-09-24 | End: 2020-09-25 | Stop reason: HOSPADM

## 2020-09-24 RX ORDER — ONDANSETRON 2 MG/ML
4 INJECTION INTRAMUSCULAR; INTRAVENOUS
Status: DISCONTINUED | OUTPATIENT
Start: 2020-09-24 | End: 2020-09-24 | Stop reason: HOSPADM

## 2020-09-24 RX ORDER — OXYCODONE HYDROCHLORIDE 5 MG/1
5 TABLET ORAL PRN
Status: DISCONTINUED | OUTPATIENT
Start: 2020-09-24 | End: 2020-09-24 | Stop reason: HOSPADM

## 2020-09-24 RX ORDER — GABAPENTIN 300 MG/1
300 CAPSULE ORAL 3 TIMES DAILY
Status: DISCONTINUED | OUTPATIENT
Start: 2020-09-24 | End: 2020-09-25 | Stop reason: HOSPADM

## 2020-09-24 RX ORDER — HYDRALAZINE HYDROCHLORIDE 20 MG/ML
5 INJECTION INTRAMUSCULAR; INTRAVENOUS EVERY 10 MIN PRN
Status: DISCONTINUED | OUTPATIENT
Start: 2020-09-24 | End: 2020-09-24 | Stop reason: HOSPADM

## 2020-09-24 RX ORDER — MORPHINE SULFATE 2 MG/ML
2 INJECTION, SOLUTION INTRAMUSCULAR; INTRAVENOUS
Status: DISCONTINUED | OUTPATIENT
Start: 2020-09-24 | End: 2020-09-25 | Stop reason: HOSPADM

## 2020-09-24 RX ORDER — LEVOTHYROXINE SODIUM 0.05 MG/1
50 TABLET ORAL DAILY
Status: DISCONTINUED | OUTPATIENT
Start: 2020-09-25 | End: 2020-09-25 | Stop reason: HOSPADM

## 2020-09-24 RX ORDER — LABETALOL HYDROCHLORIDE 5 MG/ML
5 INJECTION, SOLUTION INTRAVENOUS EVERY 10 MIN PRN
Status: DISCONTINUED | OUTPATIENT
Start: 2020-09-24 | End: 2020-09-24 | Stop reason: HOSPADM

## 2020-09-24 RX ORDER — TIZANIDINE 4 MG/1
4 TABLET ORAL DAILY
Status: DISCONTINUED | OUTPATIENT
Start: 2020-09-25 | End: 2020-09-25 | Stop reason: HOSPADM

## 2020-09-24 RX ORDER — FENTANYL CITRATE 50 UG/ML
INJECTION, SOLUTION INTRAMUSCULAR; INTRAVENOUS PRN
Status: DISCONTINUED | OUTPATIENT
Start: 2020-09-24 | End: 2020-09-24 | Stop reason: SDUPTHER

## 2020-09-24 RX ORDER — OXYCODONE HYDROCHLORIDE 5 MG/1
10 TABLET ORAL PRN
Status: DISCONTINUED | OUTPATIENT
Start: 2020-09-24 | End: 2020-09-24 | Stop reason: HOSPADM

## 2020-09-24 RX ORDER — SODIUM CHLORIDE 0.9 % (FLUSH) 0.9 %
10 SYRINGE (ML) INJECTION EVERY 12 HOURS SCHEDULED
Status: DISCONTINUED | OUTPATIENT
Start: 2020-09-24 | End: 2020-09-24 | Stop reason: HOSPADM

## 2020-09-24 RX ORDER — SODIUM CHLORIDE 0.9 % (FLUSH) 0.9 %
10 SYRINGE (ML) INJECTION EVERY 12 HOURS SCHEDULED
Status: DISCONTINUED | OUTPATIENT
Start: 2020-09-24 | End: 2020-09-25 | Stop reason: HOSPADM

## 2020-09-24 RX ORDER — CELECOXIB 100 MG/1
200 CAPSULE ORAL ONCE
Status: COMPLETED | OUTPATIENT
Start: 2020-09-24 | End: 2020-09-24

## 2020-09-24 RX ORDER — NICOTINE POLACRILEX 4 MG
15 LOZENGE BUCCAL PRN
Status: DISCONTINUED | OUTPATIENT
Start: 2020-09-24 | End: 2020-09-25 | Stop reason: HOSPADM

## 2020-09-24 RX ORDER — ONDANSETRON 2 MG/ML
INJECTION INTRAMUSCULAR; INTRAVENOUS PRN
Status: DISCONTINUED | OUTPATIENT
Start: 2020-09-24 | End: 2020-09-24 | Stop reason: SDUPTHER

## 2020-09-24 RX ORDER — PHENYLEPHRINE HCL IN 0.9% NACL 1 MG/10 ML
SYRINGE (ML) INTRAVENOUS PRN
Status: DISCONTINUED | OUTPATIENT
Start: 2020-09-24 | End: 2020-09-24 | Stop reason: SDUPTHER

## 2020-09-24 RX ORDER — INSULIN GLARGINE 100 [IU]/ML
0.25 INJECTION, SOLUTION SUBCUTANEOUS NIGHTLY
Status: DISCONTINUED | OUTPATIENT
Start: 2020-09-24 | End: 2020-09-25

## 2020-09-24 RX ORDER — PROPOFOL 10 MG/ML
INJECTION, EMULSION INTRAVENOUS PRN
Status: DISCONTINUED | OUTPATIENT
Start: 2020-09-24 | End: 2020-09-24 | Stop reason: SDUPTHER

## 2020-09-24 RX ORDER — FENTANYL CITRATE 50 UG/ML
25 INJECTION, SOLUTION INTRAMUSCULAR; INTRAVENOUS EVERY 5 MIN PRN
Status: DISCONTINUED | OUTPATIENT
Start: 2020-09-24 | End: 2020-09-24 | Stop reason: HOSPADM

## 2020-09-24 RX ORDER — MORPHINE SULFATE 4 MG/ML
4 INJECTION, SOLUTION INTRAMUSCULAR; INTRAVENOUS
Status: DISCONTINUED | OUTPATIENT
Start: 2020-09-24 | End: 2020-09-25 | Stop reason: HOSPADM

## 2020-09-24 RX ORDER — LIDOCAINE HYDROCHLORIDE 20 MG/ML
INJECTION, SOLUTION INFILTRATION; PERINEURAL PRN
Status: DISCONTINUED | OUTPATIENT
Start: 2020-09-24 | End: 2020-09-24 | Stop reason: SDUPTHER

## 2020-09-24 RX ORDER — GABAPENTIN 300 MG/1
300 CAPSULE ORAL ONCE
Status: COMPLETED | OUTPATIENT
Start: 2020-09-24 | End: 2020-09-24

## 2020-09-24 RX ORDER — DEXAMETHASONE SODIUM PHOSPHATE 4 MG/ML
INJECTION, SOLUTION INTRA-ARTICULAR; INTRALESIONAL; INTRAMUSCULAR; INTRAVENOUS; SOFT TISSUE PRN
Status: DISCONTINUED | OUTPATIENT
Start: 2020-09-24 | End: 2020-09-24 | Stop reason: SDUPTHER

## 2020-09-24 RX ORDER — OXYCODONE HYDROCHLORIDE 5 MG/1
5 TABLET ORAL EVERY 4 HOURS PRN
Status: DISCONTINUED | OUTPATIENT
Start: 2020-09-24 | End: 2020-09-25 | Stop reason: HOSPADM

## 2020-09-24 RX ORDER — OXYCODONE HYDROCHLORIDE 5 MG/1
10 TABLET ORAL EVERY 4 HOURS PRN
Status: DISCONTINUED | OUTPATIENT
Start: 2020-09-24 | End: 2020-09-25 | Stop reason: HOSPADM

## 2020-09-24 RX ORDER — MIDAZOLAM HYDROCHLORIDE 1 MG/ML
INJECTION INTRAMUSCULAR; INTRAVENOUS PRN
Status: DISCONTINUED | OUTPATIENT
Start: 2020-09-24 | End: 2020-09-24 | Stop reason: SDUPTHER

## 2020-09-24 RX ORDER — ACETAMINOPHEN 500 MG
1000 TABLET ORAL ONCE
Status: COMPLETED | OUTPATIENT
Start: 2020-09-24 | End: 2020-09-24

## 2020-09-24 RX ORDER — HYDROMORPHONE HCL 110MG/55ML
PATIENT CONTROLLED ANALGESIA SYRINGE INTRAVENOUS PRN
Status: DISCONTINUED | OUTPATIENT
Start: 2020-09-24 | End: 2020-09-24 | Stop reason: SDUPTHER

## 2020-09-24 RX ORDER — PROPOFOL 10 MG/ML
INJECTION, EMULSION INTRAVENOUS CONTINUOUS PRN
Status: DISCONTINUED | OUTPATIENT
Start: 2020-09-24 | End: 2020-09-24 | Stop reason: SDUPTHER

## 2020-09-24 RX ADMIN — HYDROMORPHONE HYDROCHLORIDE 2 MG: 2 INJECTION INTRAMUSCULAR; INTRAVENOUS; SUBCUTANEOUS at 14:15

## 2020-09-24 RX ADMIN — CELECOXIB 200 MG: 100 CAPSULE ORAL at 12:02

## 2020-09-24 RX ADMIN — Medication 100 MCG: at 14:34

## 2020-09-24 RX ADMIN — CEFAZOLIN SODIUM 2 G: 10 INJECTION, POWDER, FOR SOLUTION INTRAVENOUS at 21:48

## 2020-09-24 RX ADMIN — DOCUSATE SODIUM 50MG AND SENNOSIDES 8.6MG 1 TABLET: 8.6; 5 TABLET, FILM COATED ORAL at 21:48

## 2020-09-24 RX ADMIN — FENTANYL CITRATE 50 MCG: 50 INJECTION, SOLUTION INTRAMUSCULAR; INTRAVENOUS at 13:52

## 2020-09-24 RX ADMIN — PROPOFOL 150 MG: 10 INJECTION, EMULSION INTRAVENOUS at 13:55

## 2020-09-24 RX ADMIN — TRANEXAMIC ACID 1000 MG: 100 INJECTION, SOLUTION INTRAVENOUS at 14:03

## 2020-09-24 RX ADMIN — SODIUM CHLORIDE, POTASSIUM CHLORIDE, SODIUM LACTATE AND CALCIUM CHLORIDE: 600; 310; 30; 20 INJECTION, SOLUTION INTRAVENOUS at 12:02

## 2020-09-24 RX ADMIN — SODIUM CHLORIDE: 9 INJECTION, SOLUTION INTRAVENOUS at 18:16

## 2020-09-24 RX ADMIN — ACETAMINOPHEN 650 MG: 325 TABLET ORAL at 18:16

## 2020-09-24 RX ADMIN — OXYCODONE 10 MG: 5 TABLET ORAL at 18:16

## 2020-09-24 RX ADMIN — ONDANSETRON 4 MG: 2 INJECTION INTRAMUSCULAR; INTRAVENOUS at 13:48

## 2020-09-24 RX ADMIN — FAMOTIDINE 20 MG: 10 INJECTION INTRAVENOUS at 12:02

## 2020-09-24 RX ADMIN — GABAPENTIN 300 MG: 300 CAPSULE ORAL at 21:48

## 2020-09-24 RX ADMIN — SUGAMMADEX 200 MG: 100 INJECTION, SOLUTION INTRAVENOUS at 15:17

## 2020-09-24 RX ADMIN — FENTANYL CITRATE 50 MCG: 50 INJECTION, SOLUTION INTRAMUSCULAR; INTRAVENOUS at 13:48

## 2020-09-24 RX ADMIN — DEXAMETHASONE SODIUM PHOSPHATE 10 MG: 4 INJECTION, SOLUTION INTRAMUSCULAR; INTRAVENOUS at 14:03

## 2020-09-24 RX ADMIN — ROCURONIUM BROMIDE 50 MG: 10 SOLUTION INTRAVENOUS at 13:56

## 2020-09-24 RX ADMIN — MIDAZOLAM HYDROCHLORIDE 2 MG: 2 INJECTION, SOLUTION INTRAMUSCULAR; INTRAVENOUS at 13:48

## 2020-09-24 RX ADMIN — Medication 100 MCG: at 14:41

## 2020-09-24 RX ADMIN — Medication 100 MCG: at 15:03

## 2020-09-24 RX ADMIN — ROCURONIUM BROMIDE 10 MG: 10 SOLUTION INTRAVENOUS at 14:43

## 2020-09-24 RX ADMIN — Medication 100 MCG: at 14:29

## 2020-09-24 RX ADMIN — CELECOXIB 100 MG: 100 CAPSULE ORAL at 21:48

## 2020-09-24 RX ADMIN — LIDOCAINE HYDROCHLORIDE 100 MG: 20 INJECTION, SOLUTION INFILTRATION; PERINEURAL at 13:48

## 2020-09-24 RX ADMIN — ACETAMINOPHEN 1000 MG: 500 TABLET ORAL at 12:02

## 2020-09-24 RX ADMIN — GABAPENTIN 300 MG: 300 CAPSULE ORAL at 12:02

## 2020-09-24 RX ADMIN — PROPOFOL 50 MCG/KG/MIN: 10 INJECTION, EMULSION INTRAVENOUS at 14:06

## 2020-09-24 RX ADMIN — CEFAZOLIN SODIUM 2 G: 10 INJECTION, POWDER, FOR SOLUTION INTRAVENOUS at 14:01

## 2020-09-24 ASSESSMENT — PULMONARY FUNCTION TESTS
PIF_VALUE: 17
PIF_VALUE: 7
PIF_VALUE: 19
PIF_VALUE: 16
PIF_VALUE: 10
PIF_VALUE: 18
PIF_VALUE: 16
PIF_VALUE: 18
PIF_VALUE: 22
PIF_VALUE: 14
PIF_VALUE: 18
PIF_VALUE: 19
PIF_VALUE: 16
PIF_VALUE: 3
PIF_VALUE: 16
PIF_VALUE: 19
PIF_VALUE: 16
PIF_VALUE: 16
PIF_VALUE: 18
PIF_VALUE: 14
PIF_VALUE: 14
PIF_VALUE: 19
PIF_VALUE: 16
PIF_VALUE: 0
PIF_VALUE: 16
PIF_VALUE: 19
PIF_VALUE: 16
PIF_VALUE: 14
PIF_VALUE: 16
PIF_VALUE: 14
PIF_VALUE: 18
PIF_VALUE: 16
PIF_VALUE: 17
PIF_VALUE: 16
PIF_VALUE: 19
PIF_VALUE: 16
PIF_VALUE: 16
PIF_VALUE: 18
PIF_VALUE: 16
PIF_VALUE: 16
PIF_VALUE: 14
PIF_VALUE: 18
PIF_VALUE: 19
PIF_VALUE: 14
PIF_VALUE: 1
PIF_VALUE: 3
PIF_VALUE: 9
PIF_VALUE: 16
PIF_VALUE: 16
PIF_VALUE: 18
PIF_VALUE: 16
PIF_VALUE: 7
PIF_VALUE: 16
PIF_VALUE: 14
PIF_VALUE: 16
PIF_VALUE: 19
PIF_VALUE: 16
PIF_VALUE: 1
PIF_VALUE: 14
PIF_VALUE: 21
PIF_VALUE: 19
PIF_VALUE: 14
PIF_VALUE: 4
PIF_VALUE: 14
PIF_VALUE: 17
PIF_VALUE: 16
PIF_VALUE: 16
PIF_VALUE: 18
PIF_VALUE: 19
PIF_VALUE: 8
PIF_VALUE: 14
PIF_VALUE: 16
PIF_VALUE: 0
PIF_VALUE: 19
PIF_VALUE: 0
PIF_VALUE: 16
PIF_VALUE: 23
PIF_VALUE: 1
PIF_VALUE: 16
PIF_VALUE: 16
PIF_VALUE: 19
PIF_VALUE: 19
PIF_VALUE: 14
PIF_VALUE: 18
PIF_VALUE: 1
PIF_VALUE: 18
PIF_VALUE: 17
PIF_VALUE: 1
PIF_VALUE: 18
PIF_VALUE: 16
PIF_VALUE: 18
PIF_VALUE: 16

## 2020-09-24 ASSESSMENT — PAIN SCALES - GENERAL
PAINLEVEL_OUTOF10: 5
PAINLEVEL_OUTOF10: 7
PAINLEVEL_OUTOF10: 3
PAINLEVEL_OUTOF10: 7
PAINLEVEL_OUTOF10: 5

## 2020-09-24 ASSESSMENT — PAIN - FUNCTIONAL ASSESSMENT: PAIN_FUNCTIONAL_ASSESSMENT: 0-10

## 2020-09-24 ASSESSMENT — PAIN DESCRIPTION - DESCRIPTORS: DESCRIPTORS: CONSTANT;DISCOMFORT

## 2020-09-24 NOTE — PROGRESS NOTES
Preoperative Screening for Elective Surgery/Invasive Procedures While COVID-19 present in the community     Have you had any of the following symptoms? no  o Fever, chills  o Cough  o Shortness of breath  o Muscle aches/pain  o Diarrhea  o Abdominal pain, nausea, vomiting  o Loss or decrease in taste and / or smell   Risk of Exposure no  o Have you recently been hospitalized for COVID-19 or flu-like illness, if so when?  o Recently diagnosed with COVID-19, if so when?  o Recently tested for COVID-19, if so when?  o Have you been in close contact with a person or family member who currently has or recently had COVID-19? If yes, when and in what context?  o Do you live with anybody who in the last 14 days has had fever, chills, shortness of breath, muscle aches, flu-like illness?  o Do you have any close contacts or family members who are currently in the hospital for COVID-19 or flu-like illness? If yes, assess recent close contact with this person. Indicate if the patient has a positive screen by answering yes to one or more of the above questions. Patients who test positive or screen positive prior to surgery or on the day of surgery should be evaluated in conjunction with the surgeon/proceduralist/anesthesiologist to determine the urgency of the procedure.

## 2020-09-24 NOTE — BRIEF OP NOTE
Brief Postoperative Note      Patient: Pinky Estrada  YOB: 1975  MRN: [id number]    Date of Procedure: 9/24/2020    Pre-Op Diagnosis: LEFT HIP OSTEOARTHRITIS    Post-Op Diagnosis: Same       Procedure(s):  LEFT TOTAL HIP REPLACEMENT WITH Aubrey Saner & NEPHEW    Surgeon(s):  Alexia Mirza MD    Assistant:  Surgical Assistant: Yusuf Moses  Physician Assistant: NORAH Curtis    Anesthesia: General    Estimated Blood Loss (mL): 562     Complications: None    Specimens:   * No specimens in log *    Implants:  Implant Name Type Inv.  Item Serial No.  Lot No. LRB No. Used Action   SCREW HIP [facility] [place] 6.5X25MM Screw/Plate/Nail/Herbert SCREW HIP [facility] S[place]TH [place] 6.5X25MM  SMITH [id number] Left 1 Implanted   SCREW HIP [employer] [place] 6.5X25MM Screw/Plate/Nail/Herbert SCREW HIP [employer] [place]TH [place] 6.5X25MM  SMITH [id number] Left 1 Implanted   IMPL HIP RE SCREW HL COVER Hip IMPL HIP RE SCREW HL Carmen Jae [id number] Left 1 Implanted   IMPL HIP FEM STEM SYN PORS SZ 13 155MM Hip IMPL HIP FEM STEM SYN PORS SZ 13 155MM  MAXWELL [id number] Left 1 Implanted   IMPL HIP FEM HEAD OXINIUM 12TO14 TAPR 0 Hip IMPL HIP FEM HEAD OXINIUM 12TO14 TAPR 0  SMITH [id number] Left 1 Implanted   IMPL HIP SHLL ACET R3 3 HL CAMPOS SZ 54MM Hip IMPL HIP SHLL ACET R3 3 HL CAMPOS SZ 54MM  SMITH [id number] Left 1 Implanted   IMPL HIP ACET LINER R3 20DEG [id number] Hip IMPL HIP ACET LINER R3 20DEG 09M62JW  MAXWELL [id number] Left 1 Implanted   IMPL HIP REF THRD HOLE COVER Hip IMPL HIP REF [facility] HOLE COVER  Lorraine Val [id number] Left 1 Implanted         Drains: * No LDAs found *    Findings: left ELIANA    Electronically signed by NORAH Curtis on 9/24/2020 at 3:26 PM

## 2020-09-24 NOTE — H&P
I have reviewed the history and physical and examined the patient and find no relevant changes. I have reviewed with the patient and/or family the risks, benefits, and alternatives to the procedure. Controlled Substance Monitoring:    Acute and Chronic Pain Monitoring:   RX Monitoring 9/24/2020   Acute Pain Prescriptions Severe pain not adequately treated with lower dose. Periodic Controlled Substance Monitoring No signs of potential drug abuse or diversion identified. Patient being given increased dosage/quantity of opoid pain medication in excess of OSMB guidelines which noted a 30 MED daily of opioids due to the fact that he/she has undergone major orthopaedic surgery as outlined in rule 4731-11-13. Dosages and further duration of the pain medication will be re-evaluated at her post op visit in 2 weeks. Patient was educated on dosing expectations and limits of prescribing as a result of the new [facility] Board rules enacted August 31, 2017. Please also note that this is not the initial opoid prescription issued to this patient but a continuation of medication utilized during the hospital admission as noted in the medical record. [place]ARRS report has also been utilized to screen for any abuse history or suspicious activity as outlined in [place].   All efforts have been taken to prevent abuse potential and misuse of opioid medications including education, screening, and close clinical follow up.  ]

## 2020-09-24 NOTE — ANESTHESIA PRE PROCEDURE
Department of Anesthesiology  Preprocedure Note       Name:  Michael Vasquez   Age:  41 y.o.  :  1975                                          MRN:  [id number]         Date:  2020      Surgeon: Bolivar Aranda):  Maira Wagoner MD    Procedure: Procedure(s):  LEFT TOTAL HIP REPLACEMENT WITH CELLSAVER     MAXWELL & NEPHEW    Medications prior to admission:   Prior to Admission medications    Medication Sig Start Date End Date Taking? Authorizing Provider   levothyroxine (SYNTHROID) 50 MCG tablet Take 50 mcg by mouth Daily   Yes Historical Provider, MD   traMADol (ULTRAM) 50 MG tablet Take 50 mg by mouth daily. 20  Yes Historical Provider, MD   tiZANidine (ZANAFLEX) 4 MG tablet Take 4 mg by mouth daily 20  Yes Historical Provider, MD   metFORMIN (GLUCOPHAGE) 500 MG tablet Take 500 mg by mouth daily (with breakfast). Yes Historical Provider, MD   celecoxib (CELEBREX) 200 MG capsule Take 1 capsule by mouth 2 times daily 19   Kel Ramos MD   MELOXICAM PO Take 15 tablets by mouth daily     Historical Provider, MD   vitamin D (ERGOCALCIFEROL) 70122 UNITS CAPS capsule Take 50,000 Units by mouth once a week.     Historical Provider, MD       Current medications:    Current Facility-Administered Medications   Medication Dose Route Frequency Provider Last Rate Last Dose    lactated ringers infusion   Intravenous Continuous Isabel Caldera  mL/hr at 20 1202      sodium chloride flush 0.9 % injection 10 mL  10 mL Intravenous 2 times per day Isabel Caldera MD        sodium chloride flush 0.9 % injection 10 mL  10 mL Intravenous PRN Isabel Caldera MD        ceFAZolin (ANCEF) 2 g in dextrose 5 % 100 mL IVPB  2 g Intravenous On Call to Héctor Dukes MD        celecoxib (CELEBREX) capsule 100 mg  100 mg Oral BID Tariq Chicas MD        gabapentin (NEURONTIN) capsule 300 mg  300 mg Oral TID Tariq Chicas MD           Allergies: No Known Allergies    Problem List:  There is no problem list on file for this patient. Past Medical History:        Diagnosis Date    Hyperlipidemia     Hypertension     Polycystic ovarian disease     Thyroid disease        Past Surgical History:        Procedure Laterality Date    BLADDER SURGERY      bladder lift    CYSTOSCOPY      KNEE SURGERY      reconstructive    TONSILLECTOMY         Social History:    Social History     Tobacco Use    Smoking status: Never Smoker    Smokeless tobacco: Never Used   Substance Use Topics    Alcohol use: Yes     Comment: occassionally                                Counseling given: Not Answered      Vital Signs (Current):   Vitals:    09/21/20 1500 09/24/20 1146   BP:  125/79   Pulse:  66   Resp:  16   Temp:  98.5 °F (36.9 °C)   TempSrc:  Temporal   SpO2:  98%   Weight: 175 lb (79.4 kg) 199 lb 4.8 oz (90.4 kg)   Height: 5' 5\" (1.651 m) 5' 5\" (1.651 m)                                              BP Readings from Last 3 Encounters:   09/24/20 125/79   07/16/18 113/76   10/22/14 110/78       NPO Status: Time of last liquid consumption: 0730                        Time of last solid consumption: 2030                        Date of last liquid consumption: 09/24/20                        Date of last solid food consumption: 09/23/20    BMI:   Wt Readings from Last 3 Encounters:   09/24/20 199 lb 4.8 oz (90.4 kg)   07/13/20 158 lb (71.7 kg)   07/08/20 158 lb (71.7 kg)     Body mass index is 33.17 kg/m².     CBC:   Lab Results   Component Value Date    WBC 10.6 09/10/2020    RBC 4.44 09/10/2020    HGB 13.7 09/10/2020    HCT 41.5 09/10/2020    MCV 93.5 09/10/2020    RDW 13.1 09/10/2020     09/10/2020       CMP:   Lab Results   Component Value Date     09/10/2020    K 3.9 09/10/2020     09/10/2020    CO2 22 09/10/2020    BUN 11 09/10/2020    CREATININE 0.9 09/10/2020    GFRAA >60 09/10/2020    AGRATIO 1.4 07/16/2018    LABGLOM >60 09/10/2020

## 2020-09-24 NOTE — ANESTHESIA PROCEDURE NOTES
Peripheral Block    Patient location during procedure: pre-op  End time: 9/24/2020 1:39 PM  Staffing  Anesthesiologist: Cadence Moreno MD  Performed: anesthesiologist   Preanesthetic Checklist  Completed: patient identified, site marked, surgical consent, pre-op evaluation, timeout performed, IV checked, risks and benefits discussed, monitors and equipment checked, anesthesia consent given, oxygen available and patient being monitored  Peripheral Block  Patient position: supine  Prep: ChloraPrep  Patient monitoring: cardiac monitor, continuous pulse ox, frequent blood pressure checks and IV access  Block type: Femoral and Fascia iliaca  Laterality: left  Injection technique: single-shot  Procedures: ultrasound guided  Local infiltration: lidocaine  Infiltration strength: 1 %  Dose: 3 mL  Provider prep: mask and sterile gloves  Local infiltration: lidocaine  Needle  Needle gauge: 21 G  Needle length: 10 cm  Needle localization: ultrasound guidance  Assessment  Injection assessment: negative aspiration for heme, no paresthesia on injection and local visualized surrounding nerve on ultrasound  Paresthesia pain: none  Slow fractionated injection: yes  Hemodynamics: stable  Additional Notes  Immediately prior to procedure a \"time out\" was called to verify the correct patient, allergies, laterality, procedure and equipment. Time out performed with RN    Local Anesthetic: 0.125 %  Bupivacaine   Amount: 40 ml  in 5 ml increments after negative aspiration each time. Versed 2 mg IV    Iliopsoas Muscle and Fascia Iliaca, Femoral artery (Deep artery to the thigh take off), Femoral Vein and Femoral Nerve are identified; the tip of the need and the spread of the local anesthetic around the Femoral nerve are visualized. The Femoral nerve appeared to be anatomically normal and there were no abnormal pathologically findings seen.          Reason for block: post-op pain management and at surgeon's request

## 2020-09-24 NOTE — PROGRESS NOTES
Patient moved to block room [address] for nerve block. Patient placed on pulse ox and cardiac monitor. Patient surgical site wiped with chlorhexidine.  Clipping not needed

## 2020-09-24 NOTE — CONSULTS
Hospital Medicine  Consult History & Physical        Reason for consult: Postop medical management    Date of Service: Pt seen/examined in consultation on 9/24/2020    History Of Present Illness:      41 y.o. female who we are asked to see/evaluate by Lazaro Mendiola MD for medical management   Pt presented today for an elective surgery left total hip replacement by  . Surgery went as planned without any complications, EBL was 754 cc as reported on surgical procedure note. Pt is currently seen on the floor post op. She seems to be doing fairly well, c/o mild soreness in the left hip region that is well controlled with the pain medications ordered, apart from that she denies any chest pain, dyspnea, abdominal pain, fevers or chills, nausea or vomiting. Hospitalist team was consulted for medical management. Patient reports history of DM, HTN PCOS and thyroid disease. All well controlled with home medication regimen. Past Medical History:        Diagnosis Date    DM (diabetes mellitus), type 2 ([address] 9/24/2020    Hyperlipidemia     Hypertension     Polycystic ovarian disease     Thyroid disease        Past Surgical History:        Procedure Laterality Date    BLADDER SURGERY      bladder lift    CYSTOSCOPY      KNEE SURGERY      reconstructive    TONSILLECTOMY         Medications Prior to Admission:    Prior to Admission medications    Medication Sig Start Date End Date Taking? Authorizing Provider   levothyroxine (SYNTHROID) 50 MCG tablet Take 50 mcg by mouth Daily   Yes Historical Provider, MD   traMADol (ULTRAM) 50 MG tablet Take 50 mg by mouth daily. 8/14/20  Yes Historical Provider, MD   tiZANidine (ZANAFLEX) 4 MG tablet Take 4 mg by mouth daily 8/13/20  Yes Historical Provider, MD   metFORMIN (GLUCOPHAGE) 500 MG tablet Take 500 mg by mouth daily (with breakfast).    Yes Historical Provider, MD   celecoxib (CELEBREX) 200 MG capsule Take 1 capsule by mouth 2 times daily 12/9/19   [place] Lissette Hylton MD   MELOXICAM PO Take 15 tablets by mouth daily     Historical Provider, MD   vitamin D (ERGOCALCIFEROL) 32188 UNITS CAPS capsule Take 50,000 Units by mouth once a week. Historical Provider, MD       Allergies:  Patient has no known allergies. Social History:      The patient currently lives at home    TOBACCO:   reports that she has never smoked. She has never used smokeless tobacco.  ETOH:   reports current alcohol use. Family History:  Positive as follows:        Problem Relation Age of Onset    No Known Problems Mother     No Known Problems Father        REVIEW OF SYSTEMS:   Pertinent positives as noted in the HPI. All other systems reviewed and negative. PHYSICAL EXAM PERFORMED:  /74   Pulse 80   Temp 97.7 °F (36.5 °C) (Oral)   Resp 16   Ht 5' 5\" (1.651 m)   Wt 199 lb 4.8 oz (90.4 kg)   LMP 08/31/2020   SpO2 97%   BMI 33.17 kg/m²   General appearance: No apparent distress, appears stated age and cooperative. HEENT: Normal cephalic, atraumatic without obvious deformity. Pupils equal, round, and reactive to light. Extra ocular muscles intact. Conjunctivae/corneas clear. Neck: Supple, with full range of motion. No jugular venous distention. Trachea midline. Respiratory:  Normal respiratory effort. Clear to auscultation, bilaterally without Rales/Wheezes/Rhonchi. Cardiovascular: Regular rate and rhythm with normal S1/S2 without murmurs, rubs or gallops. Abdomen: Soft, non-tender, non-distended with normal bowel sounds. Musculoskeletal: No clubbing, cyanosis or edema bilaterally. Left thigh dressing clean and dry  Skin: Skin color, texture, turgor normal.  No rashes or lesions. Neurologic:  Neurovascularly intact without any focal sensory/motor deficits.  Cranial nerves: II-XII intact, grossly non-focal.  Psychiatric: Alert and oriented, thought content appropriate, normal insight  Capillary Refill: Brisk,< 3 seconds   Peripheral Pulses: +2 palpable, equal bilaterally       Urinalysis:    Lab Results   Component Value Date    NITRU Negative 09/10/2020    WBCUA 0-2 09/10/2020    BACTERIA 3+ 09/10/2020    RBCUA 3-4 09/10/2020    BLOODU SMALL 09/10/2020    SPECGRAV <=1.005 09/10/2020    GLUCOSEU Negative 09/10/2020       Radiology: I have reviewed the radiology reports with the following interpretation:     XR HIP LEFT (1 VIEW)   Preliminary Result   Status post left hip arthroplasty. ASSESSMENT:PLAN:    Left total hip replacement  Due to left hip osteoarthritis  Pain management, perioperative antibiotics, DVT prophylaxis ordered by orthopedics  Continue incentive spirometry and early ambulation, PT/OT  Follow labs in a.m. PCOS - continue metformin    Hypothyroidism-clinically euthyroid, resume levothyroxine    Obesity BMI 10.30  Complicating assessment and treatment, placing patient at high risk for multiple co-morbidities as well as early death and contributing to the patient's presentation. Counseled on weight loss.       DVT Prophylaxis: On Eliquis  Diet: DIET GENERAL;  Code Status: Full Code    PT/OT Eval Status: Active and ongoing    Dispo -per orthopedics  Thank you for the consultation, will follow up as needed    Electronically signed by Milvia Frausto MD on 9/24/20 at 7:19 PM EDT

## 2020-09-24 NOTE — ANESTHESIA POSTPROCEDURE EVALUATION
Department of Anesthesiology  Postprocedure Note    Patient: Hillary Phan  MRN: [id number]  YOB: 1975  Date of evaluation: 9/24/2020  Time:  3:59 PM     Procedure Summary     Date:  09/24/20 Room / Location:  [address] [place] [address]  / Elena    Anesthesia Start:  9045 Anesthesia Stop:  1533    Procedure:  LEFT TOTAL HIP REPLACEMENT WITH Deveron Rakes & NEPHEW (Left Hip) Diagnosis:       Primary osteoarthritis of left hip      (LEFT HIP OSTEOARTHRITIS)    Surgeon:  Maggie Frausto MD Responsible Provider:  Moisés Valentine MD    Anesthesia Type:  general, regional ASA Status:  3          Anesthesia Type: general, regional    Vivian Phase I: Vivian Score: 10    Vivian Phase II:      Last vitals: Reviewed and per EMR flowsheets.        Anesthesia Post Evaluation    Patient location during evaluation: PACU  Level of consciousness: awake  Airway patency: patent  Nausea & Vomiting: no nausea  Complications: no  Cardiovascular status: blood pressure returned to baseline  Respiratory status: acceptable  Hydration status: euvolemic

## 2020-09-25 VITALS
BODY MASS INDEX: 33.2 KG/M2 | HEIGHT: 65 IN | SYSTOLIC BLOOD PRESSURE: 102 MMHG | RESPIRATION RATE: 16 BRPM | OXYGEN SATURATION: 95 % | WEIGHT: 199.3 LBS | TEMPERATURE: 97.7 F | DIASTOLIC BLOOD PRESSURE: 67 MMHG | HEART RATE: 75 BPM

## 2020-09-25 LAB
ANION GAP SERPL CALCULATED.3IONS-SCNC: 11 MMOL/L (ref 3–16)
BUN BLDV-MCNC: 13 MG/DL (ref 7–20)
CALCIUM SERPL-MCNC: 8.6 MG/DL (ref 8.3–10.6)
CHLORIDE BLD-SCNC: 106 MMOL/L (ref 99–110)
CO2: 19 MMOL/L (ref 21–32)
CREAT SERPL-MCNC: 0.9 MG/DL (ref 0.6–1.1)
ESTIMATED AVERAGE GLUCOSE: 114 MG/DL
GFR AFRICAN AMERICAN: >60
GFR NON-AFRICAN AMERICAN: >60
GLUCOSE BLD-MCNC: 125 MG/DL (ref 70–99)
GLUCOSE BLD-MCNC: 149 MG/DL (ref 70–99)
GLUCOSE BLD-MCNC: 99 MG/DL (ref 70–99)
HBA1C MFR BLD: 5.6 %
HCT VFR BLD CALC: 37 % (ref 36–48)
HEMOGLOBIN: 12.2 G/DL (ref 12–16)
MCH RBC QN AUTO: 30.1 PG (ref 26–34)
MCHC RBC AUTO-ENTMCNC: 33 G/DL (ref 31–36)
MCV RBC AUTO: 91.1 FL (ref 80–100)
PDW BLD-RTO: 13 % (ref 12.4–15.4)
PERFORMED ON: ABNORMAL
PERFORMED ON: NORMAL
PLATELET # BLD: 261 K/UL (ref 135–450)
PMV BLD AUTO: 7.8 FL (ref 5–10.5)
POTASSIUM REFLEX MAGNESIUM: 4.5 MMOL/L (ref 3.5–5.1)
RBC # BLD: 4.06 M/UL (ref 4–5.2)
SODIUM BLD-SCNC: 136 MMOL/L (ref 136–145)
WBC # BLD: 16.8 K/UL (ref 4–11)

## 2020-09-25 PROCEDURE — G0378 HOSPITAL OBSERVATION PER HR: HCPCS

## 2020-09-25 PROCEDURE — 6360000002 HC RX W HCPCS: Performed by: PHYSICIAN ASSISTANT

## 2020-09-25 PROCEDURE — 85027 COMPLETE CBC AUTOMATED: CPT

## 2020-09-25 PROCEDURE — 96374 THER/PROPH/DIAG INJ IV PUSH: CPT

## 2020-09-25 PROCEDURE — 97116 GAIT TRAINING THERAPY: CPT

## 2020-09-25 PROCEDURE — 36415 COLL VENOUS BLD VENIPUNCTURE: CPT

## 2020-09-25 PROCEDURE — 97166 OT EVAL MOD COMPLEX 45 MIN: CPT

## 2020-09-25 PROCEDURE — 2580000003 HC RX 258: Performed by: PHYSICIAN ASSISTANT

## 2020-09-25 PROCEDURE — 97110 THERAPEUTIC EXERCISES: CPT

## 2020-09-25 PROCEDURE — 97535 SELF CARE MNGMENT TRAINING: CPT

## 2020-09-25 PROCEDURE — 80048 BASIC METABOLIC PNL TOTAL CA: CPT

## 2020-09-25 PROCEDURE — 6370000000 HC RX 637 (ALT 250 FOR IP): Performed by: PHYSICIAN ASSISTANT

## 2020-09-25 PROCEDURE — 97162 PT EVAL MOD COMPLEX 30 MIN: CPT

## 2020-09-25 PROCEDURE — 83036 HEMOGLOBIN GLYCOSYLATED A1C: CPT

## 2020-09-25 RX ORDER — OXYCODONE HYDROCHLORIDE AND ACETAMINOPHEN 5; 325 MG/1; MG/1
1-2 TABLET ORAL EVERY 4 HOURS PRN
Qty: 40 TABLET | Refills: 0 | Status: SHIPPED | OUTPATIENT
Start: 2020-09-25 | End: 2020-09-30 | Stop reason: SDUPTHER

## 2020-09-25 RX ADMIN — MORPHINE SULFATE 4 MG: 4 INJECTION, SOLUTION INTRAMUSCULAR; INTRAVENOUS at 11:26

## 2020-09-25 RX ADMIN — CELECOXIB 100 MG: 100 CAPSULE ORAL at 09:25

## 2020-09-25 RX ADMIN — DOCUSATE SODIUM 50MG AND SENNOSIDES 8.6MG 1 TABLET: 8.6; 5 TABLET, FILM COATED ORAL at 09:24

## 2020-09-25 RX ADMIN — METFORMIN HYDROCHLORIDE 500 MG: 500 TABLET ORAL at 09:27

## 2020-09-25 RX ADMIN — ACETAMINOPHEN 650 MG: 325 TABLET ORAL at 00:46

## 2020-09-25 RX ADMIN — GABAPENTIN 300 MG: 300 CAPSULE ORAL at 14:23

## 2020-09-25 RX ADMIN — LEVOTHYROXINE SODIUM 50 MCG: 0.05 TABLET ORAL at 05:14

## 2020-09-25 RX ADMIN — APIXABAN 2.5 MG: 2.5 TABLET, FILM COATED ORAL at 09:24

## 2020-09-25 RX ADMIN — ACETAMINOPHEN 650 MG: 325 TABLET ORAL at 11:26

## 2020-09-25 RX ADMIN — OXYCODONE 10 MG: 5 TABLET ORAL at 14:23

## 2020-09-25 RX ADMIN — OXYCODONE 10 MG: 5 TABLET ORAL at 05:14

## 2020-09-25 RX ADMIN — GABAPENTIN 300 MG: 300 CAPSULE ORAL at 09:24

## 2020-09-25 RX ADMIN — TIZANIDINE 4 MG: 4 TABLET ORAL at 09:24

## 2020-09-25 RX ADMIN — OXYCODONE 10 MG: 5 TABLET ORAL at 00:46

## 2020-09-25 RX ADMIN — OXYCODONE 10 MG: 5 TABLET ORAL at 10:02

## 2020-09-25 RX ADMIN — ACETAMINOPHEN 650 MG: 325 TABLET ORAL at 05:14

## 2020-09-25 RX ADMIN — CEFAZOLIN SODIUM 2 G: 10 INJECTION, POWDER, FOR SOLUTION INTRAVENOUS at 05:14

## 2020-09-25 RX ADMIN — Medication 10 ML: at 09:25

## 2020-09-25 ASSESSMENT — PAIN SCALES - GENERAL
PAINLEVEL_OUTOF10: 7
PAINLEVEL_OUTOF10: 3
PAINLEVEL_OUTOF10: 8
PAINLEVEL_OUTOF10: 9
PAINLEVEL_OUTOF10: 7
PAINLEVEL_OUTOF10: 8
PAINLEVEL_OUTOF10: 8
PAINLEVEL_OUTOF10: 2
PAINLEVEL_OUTOF10: 2
PAINLEVEL_OUTOF10: 9
PAINLEVEL_OUTOF10: 4

## 2020-09-25 ASSESSMENT — PAIN DESCRIPTION - ORIENTATION
ORIENTATION: LEFT

## 2020-09-25 ASSESSMENT — PAIN DESCRIPTION - PAIN TYPE
TYPE: ACUTE PAIN;SURGICAL PAIN
TYPE: ACUTE PAIN;SURGICAL PAIN
TYPE: SURGICAL PAIN
TYPE: SURGICAL PAIN
TYPE: ACUTE PAIN

## 2020-09-25 ASSESSMENT — PAIN DESCRIPTION - LOCATION
LOCATION: HIP

## 2020-09-25 NOTE — CARE COORDINATION
CASE MANAGEMENT INITIAL ASSESSMENT      Reviewed chart and completed assessment with:patient  Explained Case Management role/services. Primary contact information:Kaycee [address] [place] [address]:  Who do you trust or have selected to make healthcare decisions for you? Name:   Randi Anderson,  mother  Phone  Number: [phone]  Can this person be reached and be able to respond quickly, such as within a few minutes or hours? Yes  Who would be your back-up decision maker? Name none  Phone Number:    Admit date/status:observation  Diagnosis:post total hip replacement, left   Is this a Readmission?:  No      Insurance:BCBS   Precert required for SNF: Yes       3 night stay required: No    Living arrangements, Adls, care needs, prior to admission:Pt lives alone in a 1st floor apartment w/2 step entry;  [address] [place] [address] at home:  Walker_x_Cane__RTS__ BSC__Shower Chair__  02__ HHN__ CPAP__  BiPap__  Hospital Bed__ W/C___ Other__________    Services in the home and/or outpatient, prior to admission:none      PT/OT recs:home w/24 hr supervision    Hospital Exemption Notification (HEN):not initiated at this time    Barriers to discharge:none    Plan/comments:Pt plans to d/c home w/family and friends to assist.  Pt has family who can take her home. Pt agreeable to [id number] contacted. DCP will continue to follow for needs.  Perla Brown RN      [facility] on chart for MD daigle

## 2020-09-25 NOTE — PROGRESS NOTES
Occupational Therapy   Occupational Therapy Initial Assessment  Date: 2020   Patient Name: Shane Oliver  MRN: [id number]     : 1975    Date of Service: 2020    Discharge Recommendations:  24 hour supervision or assist(initially)       Assessment   Performance deficits / Impairments: Decreased functional mobility ; Decreased ADL status; Decreased high-level IADLs  Assessment: pt normally independent with high level IADL's & functional mobility without AD; pt s/p Left THR 20 with \"numbness\"/decreased sensation Left LE requiring mod assist with LE dressing & CGA with functional mobility with std. walker; pt to benefit from skilled OT services  OT Education: OT Role;Plan of Care;IADL Safety; ADL Adaptive Strategies  Patient Education: disease specific:  safety, Left THR, use of LE AE  REQUIRES OT FOLLOW UP: Yes  Activity Tolerance  Activity Tolerance: Patient Tolerated treatment well  Activity Tolerance: seated in chair after functional mobility on RA:  BP = 99/62  Safety Devices  Safety Devices in place: Yes  Type of devices: Call light within reach; Chair alarm in place; Left in chair;Nurse notified           Patient Diagnosis(es): There were no encounter diagnoses. has a past medical history of DM (diabetes mellitus), type 2 ([address] Hyperlipidemia, Hypertension, Polycystic ovarian disease, and Thyroid disease. has a past surgical history that includes knee surgery; Tonsillectomy; Cystoscopy; and Bladder surgery. Restrictions  Restrictions/Precautions  Restrictions/Precautions: Weight Bearing, Fall Risk, ROM Restrictions, General Precautions  Lower Extremity Weight Bearing Restrictions  Left Lower Extremity Weight Bearing: Partial Weight Bearing  Partial Weight Bearing Percentage Or Pounds: 50%  Position Activity Restriction  Other position/activity restrictions: No hip adduction beyond neutral, no external rotation and no hyperextension.  Abductor pillow    Subjective JOHN)  Equipment Used: Standard toilet  Toilet Transfer: Contact guard assistance  ADL  Grooming: Setup(from BOBBY STEDY to wash hands at sink after toileting)  LE Dressing:  Moderate assistance(seated to thread briefs to knees, CGA static standing in BOBBY STEDY to pull briefs up over hips)  Toileting: Supervision    RUE Tone: Normotonic  LUE Tone: Normotonic  Coordination  Movements Are Fluid And Coordinated: Yes     Bed mobility  Scooting: Modified independent  Transfers  Sit to stand: Contact guard assistance  Stand to sit: Contact guard assistance  Transfer Comments: min cues for safe hand placement     Vision - Basic Assessment  Prior Vision: Wears glasses all the time           Sensation  Overall Sensation Status: Impaired(numbness left anterior thigh to below knee)              Plan   Plan  Times per week: 4-6x/week  Current Treatment Recommendations: Functional Mobility Training, Safety Education & Training, Self-Care / ADL, Positioning      AM-PAC Score        AM-[facility] Inpatient Daily Activity Raw Score: 18 (09/25/20 0844)  AM-PAC Inpatient ADL T-Scale Score : 38.66 (09/25/20 0844)  ADL Inpatient CMS 0-100% Score: 46.65 (09/25/20 08)  ADL Inpatient CMS G-Code Modifier : CK (09/25/20 0844)    Goals  Short term goals  Time Frame for Short term goals: 3 days (9-28-20)  Short term goal 1: supervision with bathroom mobility by 9-27-20  Short term goal 2: supervision standing ADL's for 5 minutes  Short term goal 3: modified Independent with LE dressing with AE PRN  Short term goal 4: independent with functional/toilet transfers  Short term goal 5: Verbalize good understanding of safe car transfers; STG met 9-25-20  Patient Goals   Patient goals : be confident walking by myself       Therapy Time   Individual Concurrent Group Co-treatment   Time In [facility] 82         Time Out 0836         Minutes [facility] [place]

## 2020-09-25 NOTE — PROGRESS NOTES
Physical Therapy  Facility/Department: [facility] C5 - MED SURG/ORTHO  Daily Treatment Note  NAME: Karla Burgess  : 1975  MRN: [id number]    Date of Service: 2020    Discharge Recommendations:  24 hour supervision or assist, Patient would benefit from continued therapy after discharge   PT Equipment Recommendations  Equipment Needed: No  Other: pt owns RW    Assessment   Body structures, Functions, Activity limitations: Decreased functional mobility ; Decreased sensation;Decreased balance;Decreased strength;Decreased vision/visual deficit; Decreased endurance  Assessment: Pt tolerated second therapy session well. States pain and LLE numbness have improved. Progressing to SBA for transfers and gait training with RW. CGA-SBA to negotiate curb step with RW, ascending backward and descending forward. Left quad strength improved, however is still weak with gait training and stair training. Continue to recommend home with initial 24 supervision. Treatment Diagnosis: impaired functional mobility  Specific instructions for Next Treatment: progress mobility as tolerated  Prognosis: Good  Decision Making: Medium Complexity  PT Education: Goals; General Safety;Gait Training;PT Role;Disease Specific Education;Plan of Care; Functional Mobility Training;Home Exercise Program;Precautions;Transfer Training;Energy Conservation;Weight-bearing Education  Patient Education: Pt educated on ROM restrictions and weight bearing with gait training and transfers s/p ELIANA -- pt verbalized understanding  Barriers to Learning: none  REQUIRES PT FOLLOW UP: Yes  Activity Tolerance  Activity Tolerance: Patient Tolerated treatment well  Activity Tolerance: Limited d/t numbness. Post gait training seated in chair BP 99/62, HR 94; SpO2 98% on RA     Patient Diagnosis(es): The encounter diagnosis was History of total left hip replacement.      has a past medical history of DM (diabetes mellitus), type 2 ([facility] [address] Hyperlipidemia, Hypertension, Polycystic ovarian disease, and Thyroid disease. has a past surgical history that includes knee surgery; Tonsillectomy; Cystoscopy; Bladder surgery; and Total hip arthroplasty (Left, 9/24/2020). Restrictions  Restrictions/Precautions  Restrictions/Precautions: Weight Bearing, Fall Risk, ROM Restrictions, General Precautions  Lower Extremity Weight Bearing Restrictions  Left Lower Extremity Weight Bearing: Partial Weight Bearing  Partial Weight Bearing Percentage Or Pounds: 50%  Position Activity Restriction  Other position/activity restrictions: No hip adduction beyond neutral, no external rotation and no hyperextension. Abductor pillow  Subjective   General  Chart Reviewed: Yes  Response To Previous Treatment: Patient with no complaints from previous session. Family / Caregiver Present: Yes(friend)  Referring Practitioner: NORAH Lane  Subjective  Subjective: pt agreeable  General Comment  Comments: Pt resting in bed on approach; RN cleared pt for therapy  Pain Screening  Patient Currently in Pain: Yes  Pain Assessment  Pain Assessment: 0-10  Pain Level: 3  Pain Type: Acute pain  Pain Location: Hip  Pain Orientation: Left  Non-Pharmaceutical Pain Intervention(s): Ambulation/Increased Activity;Cold applied;Repositioned  Response to Pain Intervention: Patient Satisfied         Orientation  Orientation  Overall Orientation Status: Within Functional Limits       Objective   Bed mobility  Supine to Sit: Stand by assistance(cues for technique)  Sit to Supine: Unable to assess(pt up in chair at end of session)     Transfers  Sit to Stand: Stand by assistance(from EOB, chair, and toilet)  Stand to sit: Stand by assistance     Ambulation  Ambulation?: Yes  WB Status: 50% WBing LLE  Ambulation 1  Surface: level tile  Device: Rolling Walker  Assistance: Stand by assistance;Contact guard assistance  Quality of Gait: CGA for balance/safety initially progressing to SBA.  Cues for sequencing with forward ambulation, backing up to chair/toilet, and turning to maintain ROM restrictions. Improved quad control this afternoon compared to this morning but quad remains weak. No LOB  Gait Deviations: Decreased step length;Decreased step height  Distance: 15 ft + 125 ft    Stairs/Curb  Stairs?: Yes  Stairs  Curbs: 6\"  Device: Rolling walker  Assistance: Moderate assistance;Contact guard assistance;Stand by assistance  Comment: Mod A to ascend curb step forward on first attempt. CGA progressing to SBA to descend curb step x 3 reps. CGA to ascend curb step backward progressing to SBA to ascend backward on second trial. Cues for sequencing and technique. Cues to maintain WBing restriction     Balance  Sitting - Static: Good  Sitting - Dynamic: Good  Standing - Static: Fair;+  Standing - Dynamic: Fair  Comments: CGA to don/doff pants for toileting. SBA to wash hands at sink     Exercises  Quad Sets: x 10 BLE  Heelslides: x 10 LLE  Gluteal Sets: x 10 BLE  Knee Short Arc Quad: x 10 LLE  Ankle Pumps: x 10 BLE  Comments: Cues for technique. Handout of ELIANA protocol HEP given                                          AM-PAC Score  AM-PAC Inpatient Mobility Raw Score : 21 (09/25/20 1428)  AM-PAC Inpatient T-Scale Score : 50.25 (09/25/20 1428)  Mobility Inpatient CMS 0-100% Score: 28.97 (09/25/20 1428)  Mobility Inpatient CMS G-Code Modifier : CJ (09/25/20 1428)          Goals  Short term goals  Time Frame for Short term goals: 3 days (9/28) unless otherwise specified  Short term goal 1: Pt will be mod I with bed mobility. -- SBA 9/25  Short term goal 2: Pt will be supervision transfers with SW. -- SBA 9/25  Short term goal 3: Pt will ambulate 100 ft with SW and supervision.  --  ft 9/25  Short term goal 4: Pt will negotiate curb step x 2 reps with SW and SBA. - CGA to SBA 9/25  Short term goal 5: 9/26: Pt will be indep with ELIANA protocol HEP. -- Handout given 9/25  Patient Goals   Patient goals : \"to be able to walk in my house safely with my

## 2020-09-25 NOTE — CARE COORDINATION
[facility]    Referral received from  to follow for home care services. I will follow for needs, and speak with patient to verify demos. Approval from [address] [place] [address] Street order noted, all docs needed have been faxed to [facility] for home care services. DC set for 9/26 possibly.         Zafar Ibarra  Work mobile: [phone]  [facility] office: [phone]

## 2020-09-25 NOTE — PROGRESS NOTES
Physical Therapy    Facility/Department: [facility] C5 - MED SURG/ORTHO  Initial Assessment    NAME: Rosaline Jung  : 1975  MRN: [id number]    Date of Service: 2020    Discharge Recommendations:  24 hour supervision or assist, Patient would benefit from continued therapy after discharge   PT Equipment Recommendations  Equipment Needed: No  Other: pt owns walker    Assessment   Body structures, Functions, Activity limitations: Decreased functional mobility ; Decreased sensation;Decreased balance;Decreased strength;Decreased vision/visual deficit; Decreased endurance  Assessment: Pt is 42 yo female who presents POD 1 s/p left ELIANA anterolateral approach. Pt indep with mobility at baseline. CGA for sit<>Stand with Jennifer up used to transfer to toilet to chair d/t left knee numbness and poor quad strength. However, pt was able to ambulate 25 ft with SW and CGA. Pt would benefit from continued skilled therapy to address deficits. Recommend home with initial 24-hr sup at d/c. Treatment Diagnosis: impaired functional mobility  Specific instructions for Next Treatment: progress mobility as tolerated  Prognosis: Good  Decision Making: Medium Complexity  PT Education: Goals; General Safety;Gait Training;PT Role;Disease Specific Education;Plan of Care; Functional Mobility Training;Home Exercise Program;Precautions;Transfer Training;Energy Conservation;Weight-bearing Education  Patient Education: Pt educated on ROM restrictions and weight bearing with gait training and transfers s/p ELIANA -- pt verbalized understanding  Barriers to Learning: none  REQUIRES PT FOLLOW UP: Yes  Activity Tolerance  Activity Tolerance: Patient Tolerated treatment well  Activity Tolerance: Limited d/t numbness. Post gait training seated in chair BP 99/62, HR 94; SpO2 98% on RA       Patient Diagnosis(es): There were no encounter diagnoses.      has a past medical history of DM (diabetes mellitus), type 2 ([facility] [address] Hyperlipidemia, Hypertension, Polycystic ovarian disease, and Thyroid disease. has a past surgical history that includes knee surgery; Tonsillectomy; Cystoscopy; and Bladder surgery. Restrictions  Restrictions/Precautions  Restrictions/Precautions: Weight Bearing, Fall Risk, ROM Restrictions, General Precautions  Lower Extremity Weight Bearing Restrictions  Left Lower Extremity Weight Bearing: Partial Weight Bearing  Partial Weight Bearing Percentage Or Pounds: 50%  Position Activity Restriction  Other position/activity restrictions: No hip adduction beyond neutral, no external rotation and no hyperextension.  Abductor pillow  Vision/Hearing  Vision: Impaired  Vision Exceptions: (bifocals)  Hearing: Within functional limits     Subjective  General  Chart Reviewed: Yes  Patient assessed for rehabilitation services?: Yes  Response To Previous Treatment: Not applicable  Family / Caregiver Present: No  Referring Practitioner: NORAH Johansen  Referral Date : 09/25/20  Diagnosis: s/p left ELIANA anterolateral approach 9/24/2020  Follows Commands: Within Functional Limits  General Comment  Comments: Pt resting in bed on approach; RN cleared pt for therapy  Subjective  Subjective: pt agreeable  Pain Screening  Patient Currently in Pain: Yes  Pain Assessment  Pain Assessment: 0-10  Pain Level: 2  Pain Type: Acute pain;Surgical pain  Pain Location: Hip  Pain Orientation: Left  Non-Pharmaceutical Pain Intervention(s): Ambulation/Increased Activity;Cold applied;Repositioned  Response to Pain Intervention: Patient Satisfied    Orientation  Orientation  Overall Orientation Status: Within Functional Limits  Social/Functional History  Social/Functional History  Lives With: Alone(friend plans to stay for 24-hr sup)  Type of Home: Apartment  Home Layout: One level  Home Access: Stairs to enter without rails  Entrance Stairs - Number of Steps: 1+1  Bathroom Shower/Tub: Walk-in shower  Bathroom Toilet: Standard  Home Equipment: (has walker but unsure if RW or SW)  ADL Assistance: Independent  Homemaking Assistance: Independent  Homemaking Responsibilities: Yes  Ambulation Assistance: Independent  Transfer Assistance: Independent  Active : Yes  Occupation: Full time employment  Type of occupation: Teacher  Leisure & Hobbies: hiking, kayaking, play with dog    Objective          RLE AROM: WFL  LLE General PROM: Knee WFL  LLE General AROM: ankle WFL; knee limited d/t impaired sensation, hip limited d/t pain  Strength RLE  Strength RLE: WFL  Strength LLE  Comment: ankle WFL; unable to complete SAQ d/t quad numbness, hip limited d/t pain     Sensation  Overall Sensation Status: Impaired(numbness left anterior thigh to below knee)     Bed mobility  Supine to Sit: Minimal assistance(for LLE)  Sit to Supine: Unable to assess(pt up in chair at end of session)     Transfers  Sit to Stand: Contact guard assistance(from EOB to nayely stedy and chair to SW with cues for hand placement)  Stand to sit: Contact guard assistance     Ambulation  Ambulation?: Yes  WB Status: 50% WBing LLE  Ambulation 1  Surface: level tile  Device: Standard Walker  Assistance: Contact guard assistance  Quality of Gait: Cues for sequencing and weight bearing. Heavy reliance on UEs during LLE stance d/t weight bearing precaution but also LLE numbness with poor quad control.  No LOB or unsteadiness  Gait Deviations: Decreased step length;Decreased step height  Distance: 25 ft     Balance  Sitting - Static: Good  Sitting - Dynamic: Good  Standing - Static: Fair;+  Standing - Dynamic: Fair     Exercises  Quad Sets: x 10 BLE  Heelslides: x 5 LLE minimal ROM d/t impaired sensation  Gluteal Sets: x 10 BLE  Knee Short Arc Quad: x 10 LLE AAROM  Ankle Pumps: x 10 BLE  Comments: Cues for technique     Plan   Plan  Times per week: BID 7 days/wk  Times per day: Twice a day  Specific instructions for Next Treatment: progress mobility as tolerated  Current Treatment Recommendations: Strengthening, Neuromuscular Re-education, Home Exercise Program, ROM, Safety Education & Training, Balance Training, Endurance Training, Patient/Caregiver Education & Training, Functional Mobility Training, Equipment Evaluation, Education, & procurement, Transfer Training, Gait Training, Stair training  Safety Devices  Type of devices: All fall risk precautions in place, Call light within reach, Chair alarm in place, Gait belt, Patient at risk for falls, Nurse notified, Left in chair                        AM-PAC Score  AM-PAC Inpatient Mobility Raw Score : 18 (09/25/20 0828)  AM-PAC Inpatient T-Scale Score : 43.63 (09/25/20 0828)  Mobility Inpatient CMS 0-100% Score: 46.58 (09/25/20 0360)  Mobility Inpatient CMS G-Code Modifier : CK (09/25/20 5333)          Goals  Short term goals  Time Frame for Short term goals: 3 days (9/28) unless otherwise specified  Short term goal 1: Pt will be mod I with bed mobility. Short term goal 2: Pt will be supervision transfers with SW.  Short term goal 3: Pt will ambulate 100 ft with SW and supervision. Short term goal 4: Pt will negotiate curb step x 2 reps with SW and SBA. Short term goal 5: 9/26: Pt will be indep with ELIANA protocol HEP. Patient Goals   Patient goals : \"to be able to walk in my house safely with my walker (50 ft with CGA or less):        Therapy Time   Individual Concurrent Group Co-treatment   Time In 4935         Time Out 0815         Minutes 20         Timed Code Treatment Minutes: [facility], PT, DPT

## 2020-09-25 NOTE — PROGRESS NOTES
Physical Exam Performed:    /71   Pulse 85   Temp 98.6 °F (37 °C) (Oral)   Resp 16   Ht 5' 5\" (1.651 m)   Wt 199 lb 4.8 oz (90.4 kg)   LMP 08/31/2020   SpO2 98%   BMI 33.17 kg/m²       Labs:   Recent Labs     09/25/20  0620   WBC 16.8*   HGB 12.2   HCT 37.0        Recent Labs     09/25/20  0620      K 4.5      CO2 19*   BUN 13   CREATININE 0.9   CALCIUM 8.6     Urinalysis:      Lab Results   Component Value Date    NITRU Negative 09/10/2020    WBCUA 0-2 09/10/2020    BACTERIA 3+ 09/10/2020    RBCUA 3-4 09/10/2020    BLOODU SMALL 09/10/2020    SPECGRAV <=1.005 09/10/2020    GLUCOSEU Negative 09/10/2020       Radiology:  XR HIP LEFT (1 VIEW)   Preliminary Result   Status post left hip arthroplasty. Assessment/Plan:    Active Hospital Problems    Diagnosis    History of total left hip replacement [Z96.642]    Status post total hip replacement, left [Z96.642]    DM (diabetes mellitus), type 2 ([facility] [address] [E11.9]    Hypothyroid [E03.9]       Status post left total hip replacement on 9/24/2020:  - Postoperative management per Orthopedic Surgery. - Continue prn analgesia, bowel regimen, IS, DVT prophylaxis, PT/OT.     PCOS:  - A1C 5.6. On metformin. SSI ACHS. Dc lantus due to normal blood sugars. Monitor.      Hypothyroidism:  - Clinically euthyroid, continue levothyroxine.     Obesity:  - With Body mass index is 33.17 kg/m². Complicating assessment and treatment. Placing patient at risk for multiple co-morbidities as well as early death and contributing to the patient's presentation. Counseled on weight loss. DVT Prophylaxis: Eliquis   Diet: DIET GENERAL;  Code Status: Full Code    PT/OT Eval Status: Active and ongoing     Dispo - Per Joseph Mckee, APRN - CNP

## 2020-09-25 NOTE — PROGRESS NOTES
Department of Orthopedic Surgery  Physician Assistant   Progress Note    Subjective:     Post-Operative Day: 1 Status Post left Total Hip Arthroplasty  Systemic or Specific Complaints:Pain Control    Objective:     Patient Vitals for the past 24 hrs:   BP Temp Temp src Pulse Resp SpO2 Height Weight   09/25/20 0923 118/76 98.8 °F (37.1 °C) Oral 84 16 97 % -- --   09/25/20 0403 103/65 98.8 °F (37.1 °C) Oral 81 16 96 % -- --   09/24/20 2254 113/73 98.3 °F (36.8 °C) Oral 68 16 96 % -- --   09/24/20 1945 107/64 98.1 °F (36.7 °C) Oral 91 16 96 % -- --   09/24/20 1800 117/74 97.7 °F (36.5 °C) Oral 80 16 97 % -- --   09/24/20 1722 -- -- -- 83 -- -- -- --   09/24/20 1715 120/69 -- -- 78 12 95 % -- --   09/24/20 1700 (!) 118/43 -- -- 80 18 98 % -- --   09/24/20 1645 120/77 -- -- 80 12 99 % -- --   09/24/20 1630 126/82 -- -- 90 15 100 % -- --   09/24/20 1615 118/73 97.4 °F (36.3 °C) -- 73 12 98 % -- --   09/24/20 1600 111/65 -- -- 75 12 96 % -- --   09/24/20 1545 (!) 110/56 -- -- 84 12 97 % -- --   09/24/20 1540 108/61 -- -- 76 13 96 % -- --   09/24/20 1535 105/64 -- -- 81 17 98 % -- --   09/24/20 1530 113/62 97.3 °F (36.3 °C) -- 87 16 98 % -- --   09/24/20 1337 -- -- -- 66 16 98 % -- --   09/24/20 1332 -- -- -- 66 16 98 % -- --   09/24/20 1146 125/79 98.5 °F (36.9 °C) Temporal 66 16 98 % 5' 5\" (1.651 m) 199 lb 4.8 oz (90.4 kg)       General: alert, appears stated age and cooperative   Wound: Wound clean and dry no evidence of infection. , Wound Intact and Positive for Edema   Motion: Painful range of Motion   DVT Exam: No evidence of DVT seen on physical exam.       NVI in lower extremity. Thigh swollen but soft. Moving foot and ankle. Data Review  CBC:   Lab Results   Component Value Date    WBC 16.8 09/25/2020    RBC 4.06 09/25/2020    HGB 12.2 09/25/2020    HCT 37.0 09/25/2020     09/25/2020       Assessment:     Status Post left Total Hip Arthroplasty. Doing well postoperatively.      Plan:      1: Continues current post-op course : possible d/c home this afternoon if pain controlled.   2:  Continue Deep venous thrombosis prophylaxis  3:  Continue physical therapy  4:  Continue Pain Control

## 2020-09-25 NOTE — PLAN OF CARE
Bed mobility, Transfers, ADL training, Adaptative equipment training, Therapeutic activity, Patient education

## 2020-09-25 NOTE — OP NOTE
[address] [facility]                                OPERATIVE REPORT    PATIENT NAME: Rashel Louis                   :        1975  MED REC NO:   [id number]                          ROOM:       2395  ACCOUNT NO:   [de-identified]                           ADMIT DATE: 2020  PROVIDER:     Talita Oviedo MD    DATE OF PROCEDURE:  2020    SURGEON:  Bettie Halsted, MD    FIRST ASSISTANT:  NORAH Jackson    PREOPERATIVE DIAGNOSIS:  Severe osteoarthritis of the left hip (715.35). POSTOPERATIVE DIAGNOSIS:  Severe osteoarthritis of the left hip  (715.35). OPERATIVE PROCEDURE:  Left Smith and Nephew total hip replacement using  a 54 mm outer diameter R3 cup, two 25 mm screws, 26 x 54 mm 20-degree  offset liner, size 13 standard Synergy femoral stem with a 36 mm +0  Oxinium head. ANESTHESIA:  ebl 200cc  TOURNIQUET:  Not applicable. DRAINS:  One Hemovac. COMPLICATIONS:  None. POSITION:  Lateral decubitus position in contralateral hip from surgery  with the Vac-Wilfrid, superficial bony prominences carefully padded,  axillary roll. X-RAYS:  None. ANTIBIOTICS:  Per SCIP protocol based upon patient's age, weight, and  allergies. SPECIAL PROCEDURES:  The patient had Cell Saver and platelet gel  utilized throughout the procedure. Platelet gel was utilized on the  uncemented placement of the components as well as on the layered  closure. DISPOSITION:  To the recovery room. INDICATIONS:  The patient is 41years old. The patient's history is  well documented in the records from [facility].. The patient  has failed nonoperative measures with respect to the osteoarthritic hip  regarding patient's pain and functional capabilities.     There has been a lengthy discussion with the patient regarding the  risks, benefits, and potential complications of the operation as well as  a normal rehabilitative protocol. The patient specifically voiced  understanding to concerns regarding surgical infection, deep vein  thrombosis, dystrophy, arthrofibrosis, delayed rehabilitation,  dislocation of the prosthesis, periprosthetic fracture, neurologic  injury, etc.  We also talked about what would happen if the patient were  to undergo a hip dislocation and potential treatment in that regard. The patient voiced understanding to all of these concerns and elected to  have the procedure done. All questions were answered. DETAILS OF SURGERY:  The patient was seen in the preanesthesia holding  area by me, and I personally initialed the operative site. Any further  questions were also answered. The patient was then taken to the  operating room where anesthesia was induced and maintained by anesthesia  personnel. This was all documented in their records from [facility]. After the patient was positioned as described above, the hip was prepped  and draped with ChloraPrep. A longitudinal incision was made through the skin and carried down to  expose the tensor fascia romulo and proximal iliotibial band. Longitudinal incision was made through these tissues. The Charnley  retractor was placed and the abductor mechanism was easily isolated. Any redundant greater trochanter bursa was removed to specifically  delineate the anatomy of the abductor mechanism. A bayonet release was  performed of the abductors, sparing the tendinous portion posteriorly. The abductor musculature was then reflected off the anterior capsule of  the hip joint. Any bleeding encountered was controlled with a Bovie. With the hip capsule identified, a capsulotomy was performed and carried  up onto the edge of the acetabulum. A preliminary labral removal was  performed and, of course, dissection was carried along the medial side  of the femoral neck as well.     The hip was then dislocated without difficulty and the osteophyte tissue had  been removed at this point. Overall tissue tension was appropriate. The wound was then closed in layered fashion with No. 2 Ethibond in the  capsule and abductor mechanism. Each layer was sprayed with platelet  gel. The patient's wound was closed completely and the patient was  placed on abduction pillow postoperatively with the leg length and leg  position appearing to be appropriate. The patient was then transported  to the recovery room uneventfully.         Yasmin Zuñiga MD    D: 09/25/2020 8:09:43       T: 09/25/2020 9:18:12     [email or web address]  Job#: [id number]     Doc#: [id number]    CC:

## 2020-09-25 NOTE — CARE COORDINATION
CASE MANAGEMENT DISCHARGE SUMMARY      Discharge to: home w/[facility]      New Durable Medical Equipment ordered/agency: Pt states she has DME    Transportation: private  Confirmed discharge plan with:     Patient: yes        RN, name: Janusz Nathan RN

## 2020-09-25 NOTE — DISCHARGE INSTR - COC
Continuity of Care Form    Patient Name: Lul Montenegro   :  1975  MRN:  [id number]    Admit date:  2020  Discharge date:  2020    Code Status Order: Full Code   Advance Directives:   Advance Care Flowsheet Documentation       Date/Time Healthcare Directive Type of Healthcare Directive Copy in [address] Agent's Name Healthcare Agent's Phone Number    20 1149  No, patient does not have an advance directive for healthcare treatment -- -- -- -- --    20 1504  No, patient does not have an advance directive for healthcare treatment -- -- -- -- --            Admitting Physician:  Tayla Leyva MD  PCP: Shawna PIERRE    Discharging Nurse: [facility] Unit/Room#: 7096/3889-57  Discharging Unit Phone Number: [phone]    Emergency Contact:   Extended Emergency Contact Information  Primary Emergency Contact: Klarissa Lamb, [address] [place] [address] Way Phone: [phone]  Mobile Phone: [phone]  Relation: Parent    Past Surgical History:  Past Surgical History:   Procedure Laterality Date    BLADDER SURGERY      bladder lift    CYSTOSCOPY      KNEE SURGERY      reconstructive    TONSILLECTOMY      TOTAL HIP ARTHROPLASTY Left 2020    LEFT TOTAL HIP REPLACEMENT WITH Jose Floor & NEPHEW performed by Tayla Leyva MD at [facility]       Immunization History: There is no immunization history on file for this patient.     Active Problems:  Patient Active Problem List   Diagnosis Code    History of total left hip replacement Z96.642    Status post total hip replacement, left Z96.642    [facility] E11.9    Hypothyroid E03.9       Isolation/Infection:   Isolation            No Isolation          Patient Infection Status       None to display            Nurse Assessment:  Last Vital Signs: /76   Pulse 84   Temp 98.8 °F (37.1 °C) (Oral)   Resp 16   Ht 5' 5\" (1.651 m)   Wt 199 lb 4.8 oz (90.4 kg)   LMP 08/31/2020   SpO2 97%   BMI 33.17 kg/m²     Last documented pain score (0-10 scale): Pain Level: 9  Last Weight:   Wt Readings from Last 1 Encounters:   09/24/20 199 lb 4.8 oz (90.4 kg)     Mental Status:  oriented and alert    IV Access:  - None    Nursing Mobility/ADLs:  Walking   Independent  Transfer  Independent  Bathing  Independent  Dressing  Independent  Toileting  Independent  Feeding  Independent  Med Hermon Sizer  Independent  Med Delivery   whole    Wound Care Documentation and Therapy:        Elimination:  Continence:   · Bowel: Yes  · Bladder: Yes  Urinary Catheter: None   Colostomy/Ileostomy/Ileal Conduit: No       Date of Last BM:     Intake/Output Summary (Last 24 hours) at 9/25/2020 1124  Last data filed at 9/24/2020 1849  Gross per 24 hour   Intake 1620 ml   Output 150 ml   Net 1470 ml     I/O last 3 completed shifts: In: 9051 [P.O.:120; I.V.:1500]  Out: 150 [Blood:150]    Safety Concerns: At Risk for Falls    Impairments/Disabilities:      None    Nutrition Therapy:  Current Nutrition Therapy:   - Oral Diet:  NPO    Routes of Feeding: Oral  Liquids: No Restrictions  Daily Fluid Restriction: no  Last Modified Barium Swallow with Video (Video Swallowing Test): not done    Treatments at the Time of Hospital Discharge:   Respiratory Treatments: ***  Oxygen Therapy:  is not on home oxygen therapy.   Ventilator:    - No ventilator support    Rehab Therapies: Physical Therapy and Occupational Therapy  Weight Bearing Status/Restrictions: Partial weight bearing (30-50%) only on leg left leg  Other Medical Equipment (for information only, NOT a DME order):  walker  Other Treatments: ***    Patient's personal belongings (please select all that are sent with patient):  None    RN SIGNATURE:  Electronically signed by Pb Pritchett RN on 9/25/20 at 2:47 PM EDT    CASE MANAGEMENT/SOCIAL WORK SECTION    Inpatient Status Date: ***    Readmission Risk Assessment Score:  Readmission Risk              Risk of Unplanned Readmission:        0           Discharging to Facility/ Agency   · Name:   · Address:  · Phone:  · Fax:    Dialysis Facility (if applicable)   · Name:  · Address:  · Dialysis Schedule:  · Phone:  · Fax:    / signature: {Esignature:[id number]:::0}    PHYSICIAN SECTION    Prognosis: Good    Condition at Discharge: Stable    Rehab Potential (if transferring to Rehab): Good    Recommended Labs or Other Treatments After Discharge:     Physician Certification: I certify the above information and transfer of Ty Stokes  is necessary for the continuing treatment of the diagnosis listed and that she requires Home Care for less 30 days.      Update Admission H&P: No change in H&P    PHYSICIAN SIGNATURE:  Electronically signed by NORAH Frey on 9/25/20 at 11:24 AM EDT

## 2020-09-28 ENCOUNTER — TELEPHONE (OUTPATIENT)
Dept: ORTHOPEDIC SURGERY | Age: 45
End: 2020-09-28

## 2020-09-30 RX ORDER — OXYCODONE HYDROCHLORIDE AND ACETAMINOPHEN 5; 325 MG/1; MG/1
1 TABLET ORAL EVERY 4 HOURS PRN
Qty: 30 TABLET | Refills: 0 | Status: SHIPPED | OUTPATIENT
Start: 2020-09-30 | End: 2020-10-05

## 2020-10-07 ENCOUNTER — TELEPHONE (OUTPATIENT)
Dept: ORTHOPEDIC SURGERY | Age: 45
End: 2020-10-07

## 2020-10-07 NOTE — TELEPHONE ENCOUNTER
Attempted to contact pt, left voicemail with purpose and call back number.     Norberto Petros  Orthopedic Nurse Navigator  Phone number: [phone]    Follow up appointments:    Future Appointments   Date Time Provider Connie Garrison   10/9/2020 11:30 AM Doddridge Kales, PA [facility]

## 2020-10-09 ENCOUNTER — OFFICE VISIT (OUTPATIENT)
Dept: ORTHOPEDIC SURGERY | Age: 45
End: 2020-10-09

## 2020-10-09 VITALS — BODY MASS INDEX: 33.15 KG/M2 | WEIGHT: 199 LBS | HEIGHT: 65 IN

## 2020-10-09 PROCEDURE — 99024 POSTOP FOLLOW-UP VISIT: CPT | Performed by: PHYSICIAN ASSISTANT

## 2020-10-09 NOTE — PROGRESS NOTES
Chief Complaint   Patient presents with    Post-Op Check     PO LEFT THR SX:9/24/2020     History of present illness: The patient returns today after left total hip replacement performed on 9/24/2012. Pain control as been satisfactory with oral medications. There have been no fevers or chills. She states that her preop pain is significantly improved and she is progressing very well with home physical therapy. Physical examination: The incision is clean, dry, and intact with no drainage or signs of infection. There is expected swelling with no evidence of DVT and a negative Homans sign. Neurovascular exam is intact. Leg length is appropriate. Radiographs: 2 view X-rays taken today including AP pelvis and lateral views of the left hip show excellent alignment of the prosthesis. No evidence of septic or aseptic loosening or periprosthetic fractures. Assessment/plan: We would like to begin outpatient physical therapy to restore range of motion and strength. The patient was given local wound care instructions. We discussed antibiotic prophylaxis prior to dental procedures for 1 year postoperatively. They will followup in 3-4 weeks for reevaluation. [facility]    This dictation was performed with a verbal recognition program [facility] ) and it was checked for errors. It is possible that there are still dictated errors within this office note. If so, please bring any errors to my attention for an addendum. All efforts were made to ensure that this office note is accurate.

## 2020-10-14 ENCOUNTER — TELEPHONE (OUTPATIENT)
Dept: ORTHOPEDIC SURGERY | Age: 45
End: 2020-10-14

## 2020-10-19 NOTE — TELEPHONE ENCOUNTER
Nurse Navigator called patient for one final follow up:  Doing good, PT is going well. Pt has no questions or concerns. Signed off from pt. Instructed pt to call RN or [facility] office with any issues or concerns.     Karen Narvaez  Orthopedic Nurse Navigator  Phone number: [phone]

## 2020-10-23 NOTE — DISCHARGE SUMMARY
Department of Orthopedic Surgery  Physician Assistant   Discharge Summary      The Grabiel Mckeon is a 41 y.o. female underwent total hip replacement procedure without complication. Grabiel Mckeon was admitted to the floor following their recovery in the PACU. Discharge Diagnosis  left Hip Replacement    Current Inpatient Medications    No current facility-administered medications for this encounter. Post-operatively the patients diet was advanced as tolerated and their incision was checked on POD #1. The incision is dressing in place, clean, dry and intact with no signs of infection. The patient remained neurovascularly intact in the lower extremity and had intact pulses distally. Patients calf remained soft and showed no evidence of DVT. The patient was able to move their leg and ankle/foot without any problems post-operatively. Physical therapy and occupational therapy were consulted and began working with the patient post-operatively. The patient progressed with PT/OT as would be expected and continued to improve through their stay. The patients pain was initially controlled with IV medications but we were able to transition to oral pain medications soon after arrival to the floor and their pain remained under good control through their hospital stay. From a medical standpoint the patient remained stable and continued to have the medicine team follow throughout their stay. The patients dressing was changed/incision was checked on day of d/c. The patient will be discharged at this time to Home  with their current diet restrictions and will continue to follow the hip precautions outlined to them by us and PT/OT. Condition on Discharge: Stable    Plan  Return visit in 2 weeks. .  Patient was instructed on the use of pain medications, the signs and symptoms of infection, and was given our number to call should they have any questions or concerns following discharge.     For opioid prescriptions given at discharge the following statement is provided for compliance with [employer] rules. Patient being given increased dosage/quantity of opoid pain medication in excess of [employer] guidelines which noted a 30 MED daily of opioids due to the fact that he/she has undergone major orthopaedic surgery as outlined in rule 4731-11-13. Dosages and further duration of the pain medication will be re-evaluated at her post op visit in 2 weeks. Patient was educated on dosing expectations and limits of prescribing as a result of the new [facility] Board rules enacted August 31, 2017. Please also note that this is not the initial opoid prescription issued to this patient but a continuation of medication utilized during the hospital admission as noted in the medical record. [place]ARRS report has also been utilized to screen for any abuse history or suspicious activity as outlined in [place]. All efforts have been taken to prevent abuse potential and misuse of opioid medications including education, screening, and close clinical follow up.

## 2020-11-03 ENCOUNTER — OFFICE VISIT (OUTPATIENT)
Dept: ORTHOPEDIC SURGERY | Age: 45
End: 2020-11-03

## 2020-11-03 VITALS — HEIGHT: 65 IN | WEIGHT: 199 LBS | BODY MASS INDEX: 33.15 KG/M2

## 2020-11-03 PROCEDURE — 99024 POSTOP FOLLOW-UP VISIT: CPT | Performed by: PHYSICIAN ASSISTANT

## 2020-11-03 RX ORDER — CYCLOBENZAPRINE HCL 5 MG
5 TABLET ORAL NIGHTLY PRN
Qty: 20 TABLET | Refills: 0 | Status: SHIPPED | OUTPATIENT
Start: 2020-11-03 | End: 2020-11-23

## 2020-11-03 NOTE — PROGRESS NOTES
Chief Complaint   Patient presents with    Post-Op Check     PO LEFT THR SX:9/24/2020     History of present illness: The patient returns today after left total hip replacement performed on 9/24/2020. Doing extremely well postoperatively. She ambulates unassisted and voices minimal pain complaints. She states her only real issue is been sleeping at night. She has been taking a tizanidine however, has not had significant sleep assistance with this. Physical examination: The incision is well-healed with no drainage or signs of infection. There is expected swelling with no evidence of DVT and a negative Homans sign. Neurovascular exam is intact. Leg length is appropriate. Assessment/plan: We would like to continue outpatient physical therapy to restore range of motion and strength. I prescribed Flexeril that she can use at night to help with muscle relaxation and sleep aid. She will stop taking her tizanidine as this was not very helpful. We will see her back again 2 months from now for follow-up. Verónica Lao [facility]    This dictation was performed with a verbal recognition program [facility]) and it was checked for errors. It is possible that there are still dictated errors within this office note. If so, please bring any errors to my attention for an addendum. All efforts were made to ensure that this office note is accurate.

## 2021-01-04 ENCOUNTER — OFFICE VISIT (OUTPATIENT)
Dept: ORTHOPEDIC SURGERY | Age: 46
End: 2021-01-04

## 2021-01-04 VITALS — BODY MASS INDEX: 33.15 KG/M2 | HEIGHT: 65 IN | WEIGHT: 199 LBS

## 2021-01-04 DIAGNOSIS — Z96.642 HISTORY OF TOTAL LEFT HIP REPLACEMENT: Primary | ICD-10-CM

## 2021-01-04 PROCEDURE — 99212 OFFICE O/P EST SF 10 MIN: CPT | Performed by: PHYSICIAN ASSISTANT

## 2021-01-04 NOTE — PROGRESS NOTES
Chief Complaint   Patient presents with    Hip Pain     CK LEFT THR 9/24/20     History of present illness: The patient returns today after left total hip replacement performed on 9/24/2020. Doing very well postoperatively. She is ambulating unassisted. He has returned to working as a teacher. Physical examination: The incision is well-healed with no drainage or signs of infection. There is expected swelling with no evidence of DVT and a negative Homans sign. Neurovascular exam is intact. Leg length is appropriate. Assessment/plan: We would like to continue home physical therapy exercises to restore range of motion and strength. We discussed antibiotic prophylaxis prior to dental procedures for 1 year postoperatively. He is doing extremely well postoperatively and will follow up for her 1 year anniversary at this point. Torsten Palmer, [facility]    This dictation was performed with a verbal recognition program [facility] ) and it was checked for errors. It is possible that there are still dictated errors within this office note. If so, please bring any errors to my attention for an addendum. All efforts were made to ensure that this office note is accurate.

## 2022-05-17 NOTE — PROGRESS NOTES
Subjective    Patient ID: Concepción Simmons is a 38 y.o..  female. She is feeling significantly better. Her left hip has improved. Again she did have an injection on 1/9/2020 took about 2 to 3 weeks to improve but now is feeling better. Chief Complaint   Patient presents with    Hip Pain     LEFT       Follow-up of left hip joint injection performed on 1/9/2020. Pt has been previously diagnosed with osteoarthritis of the left hip as documented in the prior progress notes. Pain Assessment: 0-1/10      Patient's medications, allergies, past medical, surgical, social and family histories were reviewed and updated as appropriate. Physical Exam:  Physical examination today she has greater than 90 degrees of flexion of her left hip without much pain. She has some discomfort with internal rotation maximum but really not much discomfort with external rotation of the hip. None  Assessment:  1) Osteoarthritis    Plan:  Current plan is for her to continue to progress her activity very gently. She asked about workout programs. I think is obviously okay for her to walk for exercise. We also did discuss the use of exercise bike and most likely a recumbent exercise bike. I suggested that she try various exercise equipment at a [facility] before she purchase those items. She is going to try to see if she can get into the [facility] or [facility] [place]. She will follow-up with us again in 2 months. Is possible that she would need a repeat injection even sooner than that and she knows to contact us if she does.
(E4) spontaneous

## (undated) DEVICE — SMARTGOWN BREATHABLE SURGICAL GOWN: Brand: CONVERTORS

## (undated) DEVICE — SUTURE SURGLON SZ 1 L18IN NONABSORBABLE BLK GS 21 L37MM 1 2 8886196272

## (undated) DEVICE — GLOVE SURG SZ 65 THK91MIL LTX FREE SYN POLYISOPRENE

## (undated) DEVICE — PILLOW POS W15XH6XL22IN RASPBERRY FOAM ABD W/ STRP DISP FOR

## (undated) DEVICE — 35 ML SYRINGE LUER-LOCK TIP: Brand: MONOJECT

## (undated) DEVICE — SMARTGOWN SURGICAL GOWN, XL: Brand: CONVERTORS

## (undated) DEVICE — Device

## (undated) DEVICE — NEEDLE HYPO 20GA L1.5IN YEL POLYPR HUB S STL REG BVL STR

## (undated) DEVICE — PENCIL SMK EVAC ALL IN 1 DSGN ENH VISIBILITY IMPROVED AIR

## (undated) DEVICE — HOOD, PEEL-AWAY: Brand: FLYTE

## (undated) DEVICE — SUTURE ETHBND EXCEL SZ 2 L30IN NONABSORBABLE GRN L40MM V-37 MX69G

## (undated) DEVICE — SOLUTION IV IRRIG WATER 1000ML POUR BRL 2F7114

## (undated) DEVICE — 3M™ COBAN™ SELF-ADHERENT WRAP, 1586S, STERILE, 6 IN X 5 YD (15 CM X 4,5 M), 12 ROLLS/CASE: Brand: 3M™ COBAN™

## (undated) DEVICE — SOLUTION IV IRRIG POUR BRL 0.9% SODIUM CHL 2F7124

## (undated) DEVICE — SYSTEM SKIN CLSR 22CM DERMBND PRINEO

## (undated) DEVICE — OPTIFOAM GENTLE SA, POSTOP, 4X12: Brand: MEDLINE

## (undated) DEVICE — COVER,TABLE,HEAVY DUTY,50"X90",STRL: Brand: MEDLINE

## (undated) DEVICE — STERILE POLYISOPRENE POWDER-FREE SURGICAL GLOVES: Brand: PROTEXIS

## (undated) DEVICE — SOLUTION IRRIG 2000ML 0.9% SOD CHL USP UROMATIC PLAS CONT

## (undated) DEVICE — SUTURE VCRL + SZ 2-0 L18IN ABSRB UD CT1 L36MM 1/2 CIR VCP839D

## (undated) DEVICE — SUTURE VCRL + SZ 0 L18IN ABSRB UD L36MM CT-1 1/2 CIR VCP840D